# Patient Record
Sex: FEMALE | Race: WHITE | Employment: UNEMPLOYED | URBAN - METROPOLITAN AREA
[De-identification: names, ages, dates, MRNs, and addresses within clinical notes are randomized per-mention and may not be internally consistent; named-entity substitution may affect disease eponyms.]

---

## 2022-06-20 ENCOUNTER — OFFICE VISIT (OUTPATIENT)
Dept: PRIMARY CARE CLINIC | Age: 52
End: 2022-06-20
Payer: MEDICAID

## 2022-06-20 VITALS
BODY MASS INDEX: 38.43 KG/M2 | OXYGEN SATURATION: 92 % | HEIGHT: 63 IN | WEIGHT: 216.9 LBS | TEMPERATURE: 97.2 F | HEART RATE: 77 BPM | DIASTOLIC BLOOD PRESSURE: 84 MMHG | SYSTOLIC BLOOD PRESSURE: 146 MMHG

## 2022-06-20 DIAGNOSIS — I10 PRIMARY HYPERTENSION: ICD-10-CM

## 2022-06-20 DIAGNOSIS — Z01.419 WELL FEMALE EXAM WITH ROUTINE GYNECOLOGICAL EXAM: ICD-10-CM

## 2022-06-20 DIAGNOSIS — Z12.31 BREAST CANCER SCREENING BY MAMMOGRAM: ICD-10-CM

## 2022-06-20 DIAGNOSIS — Z13.220 LIPID SCREENING: ICD-10-CM

## 2022-06-20 DIAGNOSIS — Z11.59 NEED FOR HEPATITIS C SCREENING TEST: ICD-10-CM

## 2022-06-20 DIAGNOSIS — Z12.11 COLON CANCER SCREENING: ICD-10-CM

## 2022-06-20 DIAGNOSIS — M05.79 RHEUMATOID ARTHRITIS INVOLVING MULTIPLE SITES WITH POSITIVE RHEUMATOID FACTOR (HCC): ICD-10-CM

## 2022-06-20 DIAGNOSIS — Z00.8 ENCOUNTER FOR ELECTROCARDIOGRAM: ICD-10-CM

## 2022-06-20 DIAGNOSIS — M05.79 RHEUMATOID ARTHRITIS INVOLVING MULTIPLE SITES WITH POSITIVE RHEUMATOID FACTOR (HCC): Primary | ICD-10-CM

## 2022-06-20 DIAGNOSIS — J41.0 SIMPLE CHRONIC BRONCHITIS (HCC): ICD-10-CM

## 2022-06-20 DIAGNOSIS — R53.82 CHRONIC FATIGUE: ICD-10-CM

## 2022-06-20 LAB
ALBUMIN SERPL-MCNC: 4.3 G/DL (ref 3.5–5.2)
ALP BLD-CCNC: 90 U/L (ref 35–104)
ALT SERPL-CCNC: 15 U/L (ref 0–32)
ANION GAP SERPL CALCULATED.3IONS-SCNC: 10 MMOL/L (ref 7–16)
AST SERPL-CCNC: 18 U/L (ref 0–31)
BASOPHILS ABSOLUTE: 0.04 E9/L (ref 0–0.2)
BASOPHILS RELATIVE PERCENT: 0.4 % (ref 0–2)
BILIRUB SERPL-MCNC: 0.5 MG/DL (ref 0–1.2)
BILIRUBIN URINE: NEGATIVE
BLOOD, URINE: NEGATIVE
BUN BLDV-MCNC: 7 MG/DL (ref 6–20)
C-REACTIVE PROTEIN: 1.4 MG/DL (ref 0–0.4)
CALCIUM SERPL-MCNC: 9.6 MG/DL (ref 8.6–10.2)
CHLORIDE BLD-SCNC: 101 MMOL/L (ref 98–107)
CHOLESTEROL, TOTAL: 218 MG/DL (ref 0–199)
CLARITY: CLEAR
CO2: 29 MMOL/L (ref 22–29)
COLOR: YELLOW
CREAT SERPL-MCNC: 0.6 MG/DL (ref 0.5–1)
CREATININE URINE: 100 MG/DL (ref 29–226)
EOSINOPHILS ABSOLUTE: 0 E9/L (ref 0.05–0.5)
EOSINOPHILS RELATIVE PERCENT: 0 % (ref 0–6)
GFR AFRICAN AMERICAN: >60
GFR NON-AFRICAN AMERICAN: >60 ML/MIN/1.73
GLUCOSE BLD-MCNC: 90 MG/DL (ref 74–99)
GLUCOSE URINE: NEGATIVE MG/DL
HCT VFR BLD CALC: 62.2 % (ref 34–48)
HDLC SERPL-MCNC: 47 MG/DL
HEMOGLOBIN: 18.4 G/DL (ref 11.5–15.5)
IMMATURE GRANULOCYTES #: 0.03 E9/L
IMMATURE GRANULOCYTES %: 0.3 % (ref 0–5)
KETONES, URINE: NEGATIVE MG/DL
LDL CHOLESTEROL CALCULATED: 144 MG/DL (ref 0–99)
LEUKOCYTE ESTERASE, URINE: NEGATIVE
LYMPHOCYTES ABSOLUTE: 2.3 E9/L (ref 1.5–4)
LYMPHOCYTES RELATIVE PERCENT: 24.6 % (ref 20–42)
MCH RBC QN AUTO: 27.2 PG (ref 26–35)
MCHC RBC AUTO-ENTMCNC: 29.6 % (ref 32–34.5)
MCV RBC AUTO: 92 FL (ref 80–99.9)
MICROALBUMIN UR-MCNC: <12 MG/L
MICROALBUMIN/CREAT UR-RTO: ABNORMAL (ref 0–30)
MONOCYTES ABSOLUTE: 0.72 E9/L (ref 0.1–0.95)
MONOCYTES RELATIVE PERCENT: 7.7 % (ref 2–12)
NEUTROPHILS ABSOLUTE: 6.25 E9/L (ref 1.8–7.3)
NEUTROPHILS RELATIVE PERCENT: 67 % (ref 43–80)
NITRITE, URINE: NEGATIVE
PDW BLD-RTO: 16.5 FL (ref 11.5–15)
PH UA: 6 (ref 5–9)
PLATELET # BLD: 241 E9/L (ref 130–450)
PMV BLD AUTO: 12.6 FL (ref 7–12)
POTASSIUM SERPL-SCNC: 4.5 MMOL/L (ref 3.5–5)
PROTEIN UA: NEGATIVE MG/DL
RBC # BLD: 6.76 E12/L (ref 3.5–5.5)
RHEUMATOID FACTOR: 476 IU/ML (ref 0–13)
SODIUM BLD-SCNC: 140 MMOL/L (ref 132–146)
SPECIFIC GRAVITY UA: 1.02 (ref 1–1.03)
TOTAL PROTEIN: 8 G/DL (ref 6.4–8.3)
TRIGL SERPL-MCNC: 137 MG/DL (ref 0–149)
TSH SERPL DL<=0.05 MIU/L-ACNC: 2.39 UIU/ML (ref 0.27–4.2)
UROBILINOGEN, URINE: 0.2 E.U./DL
VITAMIN D 25-HYDROXY: 16 NG/ML (ref 30–100)
VLDLC SERPL CALC-MCNC: 27 MG/DL
WBC # BLD: 9.3 E9/L (ref 4.5–11.5)

## 2022-06-20 PROCEDURE — 4004F PT TOBACCO SCREEN RCVD TLK: CPT | Performed by: INTERNAL MEDICINE

## 2022-06-20 PROCEDURE — 93000 ELECTROCARDIOGRAM COMPLETE: CPT | Performed by: INTERNAL MEDICINE

## 2022-06-20 PROCEDURE — G8427 DOCREV CUR MEDS BY ELIG CLIN: HCPCS | Performed by: INTERNAL MEDICINE

## 2022-06-20 PROCEDURE — 3017F COLORECTAL CA SCREEN DOC REV: CPT | Performed by: INTERNAL MEDICINE

## 2022-06-20 PROCEDURE — 3023F SPIROM DOC REV: CPT | Performed by: INTERNAL MEDICINE

## 2022-06-20 PROCEDURE — G8417 CALC BMI ABV UP PARAM F/U: HCPCS | Performed by: INTERNAL MEDICINE

## 2022-06-20 PROCEDURE — 99205 OFFICE O/P NEW HI 60 MIN: CPT | Performed by: INTERNAL MEDICINE

## 2022-06-20 SDOH — ECONOMIC STABILITY: FOOD INSECURITY: WITHIN THE PAST 12 MONTHS, THE FOOD YOU BOUGHT JUST DIDN'T LAST AND YOU DIDN'T HAVE MONEY TO GET MORE.: NEVER TRUE

## 2022-06-20 SDOH — ECONOMIC STABILITY: FOOD INSECURITY: WITHIN THE PAST 12 MONTHS, YOU WORRIED THAT YOUR FOOD WOULD RUN OUT BEFORE YOU GOT MONEY TO BUY MORE.: NEVER TRUE

## 2022-06-20 ASSESSMENT — PATIENT HEALTH QUESTIONNAIRE - PHQ9
1. LITTLE INTEREST OR PLEASURE IN DOING THINGS: 0
2. FEELING DOWN, DEPRESSED OR HOPELESS: 0
SUM OF ALL RESPONSES TO PHQ QUESTIONS 1-9: 0
SUM OF ALL RESPONSES TO PHQ QUESTIONS 1-9: 0
SUM OF ALL RESPONSES TO PHQ9 QUESTIONS 1 & 2: 0
SUM OF ALL RESPONSES TO PHQ QUESTIONS 1-9: 0
SUM OF ALL RESPONSES TO PHQ QUESTIONS 1-9: 0

## 2022-06-20 ASSESSMENT — ANXIETY QUESTIONNAIRES
6. BECOMING EASILY ANNOYED OR IRRITABLE: 0
4. TROUBLE RELAXING: 0
3. WORRYING TOO MUCH ABOUT DIFFERENT THINGS: 0
IF YOU CHECKED OFF ANY PROBLEMS ON THIS QUESTIONNAIRE, HOW DIFFICULT HAVE THESE PROBLEMS MADE IT FOR YOU TO DO YOUR WORK, TAKE CARE OF THINGS AT HOME, OR GET ALONG WITH OTHER PEOPLE: NOT DIFFICULT AT ALL
5. BEING SO RESTLESS THAT IT IS HARD TO SIT STILL: 0
1. FEELING NERVOUS, ANXIOUS, OR ON EDGE: 0
2. NOT BEING ABLE TO STOP OR CONTROL WORRYING: 0
GAD7 TOTAL SCORE: 0
7. FEELING AFRAID AS IF SOMETHING AWFUL MIGHT HAPPEN: 0

## 2022-06-20 ASSESSMENT — SOCIAL DETERMINANTS OF HEALTH (SDOH): HOW HARD IS IT FOR YOU TO PAY FOR THE VERY BASICS LIKE FOOD, HOUSING, MEDICAL CARE, AND HEATING?: NOT HARD AT ALL

## 2022-06-20 NOTE — PROGRESS NOTES
HPI:  The patient is a 45 y.o. female who presents today to [facility] care. She has a past medical history of rheumatoid arthritis that has been in remission for almost 10 years. This was confirmed by rheumatologist.  She was on some medication in the beginning of the diagnosis but she was able to get off of them. She also has a history of exposure to TB and was treated for 6-month. She has been smoking for over 20 years started off at 2 packs a day and now she is trying to wean off with about 10 cigarettes/day. The patient complains of shortness of breath on mild exertion may be climbing 1 flight of stairs or walking for 1 block. On exam she has auditory wheezing on bilateral chest feels associated with harsh adventitious sounds and rhonchi. She denies any chest pains abdominal pains nausea vomiting or diarrhea. She is not currently on any medication except as needed ibuprofen for her aches and pains. She fell few days ago on the floor with mild complaint of tenderness on the right supraclavicular area. She was also found to be hypertensive today questionable whitecoat syndrome. .    Past Medical History:   Diagnosis Date    Arthritis, rheumatic, acute or subacute     Rheumatoid arthritis involving multiple sites with positive rheumatoid factor ([facility] [address] 6/20/2022      No past surgical history on file. No family history on file.   Social History     Socioeconomic History    Marital status:      Spouse name: Not on file    Number of children: Not on file    Years of education: Not on file    Highest education level: Not on file   Occupational History    Not on file   Tobacco Use    Smoking status: Current Every Day Smoker     Packs/day: 0.50     Years: 38.00     Pack years: 19.00     Types: Cigarettes     Start date: 45    Smokeless tobacco: Never Used   Vaping Use    Vaping Use: Some days    Substances: Nicotine, Flavoring    Devices: Disposable, Pre-filled or refillable cartridge    Passive vaping exposure: Yes   Substance and Sexual Activity    Alcohol use: No    Drug use: No    Sexual activity: Not on file   Other Topics Concern    Not on file   Social History Narrative    Not on file     Social Determinants of Health     Financial Resource Strain: Low Risk     Difficulty of Paying Living Expenses: Not hard at all   Food Insecurity: No Food Insecurity    Worried About [address] in the Last Year: Never true    [address] in the Last Year: Never true   Transportation Needs:     Lack of Transportation (Medical): Not on file    Lack of Transportation (Non-Medical):  Not on file   Physical Activity:     Days of Exercise per Week: Not on file    Minutes of Exercise per Session: Not on file   Stress:     Feeling of Stress : Not on file   Social Connections:     Frequency of Communication with Friends and Family: Not on file    Frequency of Social Gatherings with Friends and Family: Not on file    Attends [employer] Services: Not on file    Active Member of [address] [place] [address] or Organizations: Not on file    Attends Club or Organization Meetings: Not on file    Marital Status: Not on file   Intimate Partner Violence:     Fear of Current or Ex-Partner: Not on file    Emotionally Abused: Not on file    Physically Abused: Not on file    Sexually Abused: Not on file   Housing Stability:     Unable to Pay for Housing in the Last Year: Not on file    Number of [place] in the Last Year: Not on file    Unstable Housing in the Last Year: Not on file      No Known Allergies     Review of Systems:  Constitutional:  No fever, no fatigue, no chills, no headaches, no weight change  Dermatology:  No rash, no mole, no dry or sensitive skin  ENT:  No cough, no sore throat, no sinus pain, no runny nose, no ear pain  Cardiology:  No chest pain, no palpitations, no leg edema, no shortness of breath, no PND  Endocrinology:  No polydipsia, no polyuria, no cold intolerance, no heat intolerance, no polyphagia, no hair changes  Gastroenterology:  No dysphagia, no abdominal pain, no nausea, no vomiting, no constipation, no diarrhea, no heartburn  Female Reproductive:  No hot flashes, no abnormal vaginal discharge, no pain with menstruation, no pelvic pain  Musculoskeletal:  No joint pain, no leg cramps, no back pain, no muscle aches  Respiratory:  No shortness of breath, no orthopnea, no wheezing, no HERNANDEZ, no hemoptysis  Urology:  No blood in the urine, no urinary frequency, no urinary incontinence, no urinary urgency, no nocturia, no dysuria  Neurology:  No numbness/tingling, no dizziness, no weakness  Psychology:  No depression, no sleep disturbances, no suicidal ideation, no anxiety      PHYSICAL EXAM:    Vitals:  BP (!) 146/84   Pulse 77   Temp 97.2 °F (36.2 °C) (Temporal)   Ht 5' 2.5\" (1.588 m)   Wt 216 lb 14.4 oz (98.4 kg)   LMP 03/15/2022 (Exact Date) Comment: Tubal Ligation  SpO2 92%   Breastfeeding No   BMI 39.04 kg/m²     General:  Awake, alert, oriented X 3. Well developed, well nourished, well groomed. Obese, no apparent distress. HEENT:  Normocephalic, atraumatic. Pupils equal, round, reactive to light. No scleral icterus. No conjunctival injection. Normal lips, lost most of her teeth, no  Gum issues. No nasal discharge. Neck:  Supple  Heart:  RRR, no murmurs, gallops, or rubs  Lungs: Scattered harsh rhonchi all over bilateral lung fields with expiratory wheezing and adventitious sounds on deep breathing with no rales  Abdomen: Bowel sounds present, soft, nontender, no masses, no organomegaly, no peritoneal signs  Extremities:  No clubbing, cyanosis, or edema  Skin:  Warm and dry, no open lesions or rash  Neuro:  Cranial nerves 2-12 intact, no focal deficits    Assessment & Plan:  1. Rheumatoid arthritis involving multiple sites with positive rheumatoid factor (HCC)  -     TOMA; Future  -     RHEUMATOID FACTOR; Future  -     Sedimentation Rate;  Future  - C-Reactive Protein; Future  -     Vitamin D 25 Hydroxy; Future  2. Simple chronic bronchitis (HCC)  -     XR CHEST (2 VW); Future  3. Primary hypertension  -     Comprehensive Metabolic Panel; Future  -     CBC with Auto Differential; Future  -     Urinalysis; Future  -     Microalbumin / Creatinine Urine Ratio; Future  4. Chronic fatigue  -     TSH; Future  -     Hepatitis C Antibody; Future  5. Need for hepatitis C screening test  6. Breast cancer screening by mammogram  -     MARIBELL DIGITAL SCREEN W OR WO CAD BILATERAL; Future  7. Colon cancer screening  8. Lipid screening  -     Lipid Panel; Future     Was counseled to get her routine screening mammogram yearly and have pelvic exam.  We will refer to gynecology.   She was also counseled to get her colonoscopy

## 2022-06-21 LAB
HEPATITIS C ANTIBODY INTERPRETATION: REACTIVE
SEDIMENTATION RATE, ERYTHROCYTE: 0 MM/HR (ref 0–20)

## 2022-06-22 LAB — ANTI-NUCLEAR ANTIBODY (ANA): NEGATIVE

## 2022-06-24 ENCOUNTER — TELEPHONE (OUTPATIENT)
Dept: PRIMARY CARE CLINIC | Age: 52
End: 2022-06-24

## 2022-06-27 ENCOUNTER — TELEPHONE (OUTPATIENT)
Dept: PRIMARY CARE CLINIC | Age: 52
End: 2022-06-27

## 2022-06-27 NOTE — TELEPHONE ENCOUNTER
Pt received sample of breztri at last appt and is using 2times in am 2times in pm will run out prior to appt- do you want refilled- if so please send to [facility]

## 2022-06-30 ENCOUNTER — TELEPHONE (OUTPATIENT)
Dept: PRIMARY CARE CLINIC | Age: 52
End: 2022-06-30

## 2022-06-30 NOTE — TELEPHONE ENCOUNTER
PT WILL CHECK WITH HER INSURANCE COMPANY AND SEE WHAT IS COVERED IN THAT CLASS OF DRUGS, SHE WILL THEN CALL US BACK.

## 2022-07-01 ENCOUNTER — TELEPHONE (OUTPATIENT)
Dept: PRIMARY CARE CLINIC | Age: 52
End: 2022-07-01

## 2022-07-05 ENCOUNTER — OFFICE VISIT (OUTPATIENT)
Dept: PRIMARY CARE CLINIC | Age: 52
End: 2022-07-05
Payer: MEDICAID

## 2022-07-05 ENCOUNTER — HOSPITAL ENCOUNTER (OUTPATIENT)
Dept: GENERAL RADIOLOGY | Age: 52
Discharge: HOME | End: 2022-07-07
Payer: MEDICAID

## 2022-07-05 ENCOUNTER — HOSPITAL ENCOUNTER (OUTPATIENT)
Age: 52
Discharge: HOME | End: 2022-07-07
Payer: MEDICAID

## 2022-07-05 VITALS
BODY MASS INDEX: 39.05 KG/M2 | HEIGHT: 63 IN | SYSTOLIC BLOOD PRESSURE: 130 MMHG | WEIGHT: 220.4 LBS | DIASTOLIC BLOOD PRESSURE: 78 MMHG | OXYGEN SATURATION: 99 % | TEMPERATURE: 96.8 F | HEART RATE: 69 BPM

## 2022-07-05 DIAGNOSIS — R76.8 HEPATITIS C ANTIBODY POSITIVE IN BLOOD: Primary | ICD-10-CM

## 2022-07-05 DIAGNOSIS — E78.2 HYPERLIPIDEMIA, MIXED: ICD-10-CM

## 2022-07-05 DIAGNOSIS — E55.9 VITAMIN D DEFICIENCY: ICD-10-CM

## 2022-07-05 DIAGNOSIS — M05.79 RHEUMATOID ARTHRITIS INVOLVING MULTIPLE SITES WITH POSITIVE RHEUMATOID FACTOR (HCC): ICD-10-CM

## 2022-07-05 DIAGNOSIS — J41.0 SIMPLE CHRONIC BRONCHITIS (HCC): ICD-10-CM

## 2022-07-05 DIAGNOSIS — R76.8 HEPATITIS C ANTIBODY POSITIVE IN BLOOD: ICD-10-CM

## 2022-07-05 DIAGNOSIS — J41.8 MIXED SIMPLE AND MUCOPURULENT CHRONIC BRONCHITIS (HCC): ICD-10-CM

## 2022-07-05 DIAGNOSIS — R53.82 CHRONIC FATIGUE: ICD-10-CM

## 2022-07-05 PROCEDURE — 3023F SPIROM DOC REV: CPT | Performed by: INTERNAL MEDICINE

## 2022-07-05 PROCEDURE — 71046 X-RAY EXAM CHEST 2 VIEWS: CPT

## 2022-07-05 PROCEDURE — 4004F PT TOBACCO SCREEN RCVD TLK: CPT | Performed by: INTERNAL MEDICINE

## 2022-07-05 PROCEDURE — G8427 DOCREV CUR MEDS BY ELIG CLIN: HCPCS | Performed by: INTERNAL MEDICINE

## 2022-07-05 PROCEDURE — G8417 CALC BMI ABV UP PARAM F/U: HCPCS | Performed by: INTERNAL MEDICINE

## 2022-07-05 PROCEDURE — 3017F COLORECTAL CA SCREEN DOC REV: CPT | Performed by: INTERNAL MEDICINE

## 2022-07-05 PROCEDURE — 99214 OFFICE O/P EST MOD 30 MIN: CPT | Performed by: INTERNAL MEDICINE

## 2022-07-05 RX ORDER — BUDESONIDE AND FORMOTEROL FUMARATE DIHYDRATE 160; 4.5 UG/1; UG/1
2 AEROSOL RESPIRATORY (INHALATION) 2 TIMES DAILY
Qty: 30.6 G | Refills: 1 | Status: SHIPPED | OUTPATIENT
Start: 2022-07-05

## 2022-07-05 NOTE — PROGRESS NOTES
Chief Complaint   Patient presents with    2 Week Follow-Up     Pt is here as a 2 week F/U, and states she is feeling better at this time. Pt had a period last week, and profile updated. HPI:  Patient is here for follow-up of blood work COPD and rheumatoid arthritis. Patient is doing a lot better with using Brezetri  inhaler. Her shortness of breath has completely subsided and she noticed it more when she ran out for the last 2 days. Unfortunately she has to have a prior authorization for any of the inhalers. We will send her Trelegy. Blood work was discussed with patient appears within reasonable range except for hyperlipidemia with an LDL of 144. Patient was counseled for low-fat diet. Also her hemoglobin count was elevated at 18 g questionable secondary to hypercapnia and smoking. Patient was advised to do some breathing exercising decrease the amount of smoking and we will check her for sleep apnea as her BMI is more than 39 and according to her daughter she snores at night and have a very unrestful sleep. Rheumatoid factor was elevated but her ESR was within normal limits. CRP is mildly elevated. Hepatitis C came back reactive and patient denies any blood transfusion or any infection from prior or any sexually transmitted disease as she has been matted in with her  for close to 30 years. We will check acute hepatitis panel,  And viral load and ultrasound of the liver    Past Medical History, Surgical History, and Family History has been reviewed and updated.     Review of Systems:  Constitutional:  No fever, no fatigue, no chills, no headaches, no weight change  Dermatology:  No rash, no mole, no dry or sensitive skin  ENT:  No cough, no sore throat, no sinus pain, no runny nose, no ear pain  Cardiology:  No chest pain, no palpitations, no leg edema, no shortness of breath, no PND  Gastroenterology:  No dysphagia, no abdominal pain, no nausea, no vomiting, no constipation, no diarrhea, no heartburn  Musculoskeletal:  No joint pain, no leg cramps, no back pain, no muscle aches  Respiratory:  No shortness of breath, no orthopnea, no wheezing, no HERNANDEZ, no hemoptysis  Urology:  No blood in the urine, no urinary frequency, no urinary incontinence, no urinary urgency, no nocturia, no dysuria    Vitals:    07/05/22 0941   BP: 130/78   Pulse: 69   Temp: 96.8 °F (36 °C)   TempSrc: Temporal   SpO2: 99%   Weight: 220 lb 6.4 oz (100 kg)   Height: 5' 2.5\" (1.588 m)       General:  Patient alert and oriented x 3, NAD, pleasant  HEENT:  Atraumatic, normocephalic, PERRLA, EOMI, clear conjunctiva, TMs clear, nose-clear, throat - no erythema  Neck:  Supple, no goiter, no carotid bruits, no LAD  Lungs: Tattered harsh rhonchi mainly on the right base with improved air exchange  Heart:  RRR, no murmurs, gallops or rubs  Abdomen:  Soft/nt/nd, + bowel sounds  Extremities:  No clubbing, cyanosis or edema  Skin: unremarkable    Cholesterol, Total   Date Value Ref Range Status   06/20/2022 218 (H) 0 - 199 mg/dL Final     Triglycerides   Date Value Ref Range Status   06/20/2022 137 0 - 149 mg/dL Final     HDL   Date Value Ref Range Status   06/20/2022 47 >40 mg/dL Final     LDL Calculated   Date Value Ref Range Status   06/20/2022 144 (H) 0 - 99 mg/dL Final     VLDL Cholesterol Calculated   Date Value Ref Range Status   06/20/2022 27 mg/dL Final     Sodium   Date Value Ref Range Status   06/20/2022 140 132 - 146 mmol/L Final     Potassium   Date Value Ref Range Status   06/20/2022 4.5 3.5 - 5.0 mmol/L Final     Chloride   Date Value Ref Range Status   06/20/2022 101 98 - 107 mmol/L Final     CO2   Date Value Ref Range Status   06/20/2022 29 22 - 29 mmol/L Final     BUN   Date Value Ref Range Status   06/20/2022 7 6 - 20 mg/dL Final     CREATININE   Date Value Ref Range Status   06/20/2022 0.6 0.5 - 1.0 mg/dL Final     Glucose   Date Value Ref Range Status   06/20/2022 90 74 - 99 mg/dL Final     Calcium   Date Value Ref Range Status   06/20/2022 9.6 8.6 - 10.2 mg/dL Final     Total Protein   Date Value Ref Range Status   06/20/2022 8.0 6.4 - 8.3 g/dL Final     Albumin   Date Value Ref Range Status   06/20/2022 4.3 3.5 - 5.2 g/dL Final     Total Bilirubin   Date Value Ref Range Status   06/20/2022 0.5 0.0 - 1.2 mg/dL Final     Alkaline Phosphatase   Date Value Ref Range Status   06/20/2022 90 35 - 104 U/L Final     AST   Date Value Ref Range Status   06/20/2022 18 0 - 31 U/L Final     ALT   Date Value Ref Range Status   06/20/2022 15 0 - 32 U/L Final     GFR Non-   Date Value Ref Range Status   06/20/2022 >60 >=60 mL/min/1.73 Final     Comment:     Chronic Kidney Disease: less than 60 ml/min/1.73 sq.m. Kidney Failure: less than 15 ml/min/1.73 sq.m. Results valid for patients 18 years and older. GFR    Date Value Ref Range Status   06/20/2022 >60  Final        No results found. Assessment/Plan:      Outpatient Encounter Medications as of 7/5/2022   Medication Sig Dispense Refill    fluticasone-umeclidin-vilant (TRELEGY ELLIPTA) 100-62.5-25 MCG/INH AEPB Inhale 1 puff into the lungs daily 1 each 3    Budeson-Glycopyrrol-Formoterol 160-9-4.8 MCG/ACT AERO Inhale 2 inhalations into the lungs in the morning and at bedtime 1 each 2     No facility-administered encounter medications on file as of 7/5/2022. Janie Vila was seen today for 2 week follow-up. Diagnoses and all orders for this visit:    Hepatitis C antibody positive in blood  -     HEP C VIRAL LOAD; Future  -     Hepatitis Panel, Acute; Future    Simple chronic bronchitis (HCC)    Rheumatoid arthritis involving multiple sites with positive rheumatoid factor (HCC)    Chronic fatigue    Hyperlipidemia, mixed    Vitamin D deficiency    Other orders  -     US ABDOMEN COMPLETE; Future  -     fluticasone-umeclidin-vilant (Sisto Sprinkles) 100-62.5-25 MCG/INH AEPB;  Inhale 1 puff into the lungs daily    Low-fat diet counseling  Check ultrasound of the abdomen  Check hepatitis C viral load and acute hepatitis panel  Start Trelegy 1 puff daily  Will check for sleep apnea study in the next visit    Patient Instructions   Vit D 5000 IU daily       On this date 7/5/2022 I have spent 30 minutes reviewing previous notes, test results and face to face with the patient discussing the diagnosis and importance of compliance with the treatment plan as well as documenting on the day of the visit.       Andrea Walton MD   7/5/22

## 2022-07-08 LAB
HAV IGM SER IA-ACNC: ABNORMAL
HEPATITIS B CORE IGM ANTIBODY: ABNORMAL
HEPATITIS B SURFACE ANTIGEN INTERPRETATION: ABNORMAL
HEPATITIS C ANTIBODY INTERPRETATION: REACTIVE

## 2022-07-09 LAB
HCV QNT BY NAAT IU/ML: NOT DETECTED IU/ML
HCV QNT BY NAAT LOG IU/ML: NOT DETECTED LOG IU/ML
INTERPRETATION: NOT DETECTED

## 2022-07-12 ENCOUNTER — HOSPITAL ENCOUNTER (OUTPATIENT)
Dept: ULTRASOUND IMAGING | Age: 52
Discharge: HOME | End: 2022-07-12
Payer: MEDICAID

## 2022-07-12 DIAGNOSIS — R76.8 HEPATITIS C ANTIBODY POSITIVE IN BLOOD: ICD-10-CM

## 2022-07-12 PROCEDURE — 76705 ECHO EXAM OF ABDOMEN: CPT

## 2022-07-15 ENCOUNTER — HOSPITAL ENCOUNTER (OUTPATIENT)
Dept: MAMMOGRAPHY | Age: 52
Discharge: HOME | End: 2022-07-17
Payer: MEDICAID

## 2022-07-15 VITALS — WEIGHT: 210 LBS | HEIGHT: 62 IN | BODY MASS INDEX: 38.64 KG/M2

## 2022-07-15 DIAGNOSIS — Z12.31 BREAST CANCER SCREENING BY MAMMOGRAM: ICD-10-CM

## 2022-07-15 PROCEDURE — 77063 BREAST TOMOSYNTHESIS BI: CPT

## 2022-07-20 PROBLEM — Z11.59 NEED FOR HEPATITIS C SCREENING TEST: Status: RESOLVED | Noted: 2022-06-20 | Resolved: 2022-07-20

## 2022-08-08 ENCOUNTER — OFFICE VISIT (OUTPATIENT)
Dept: PRIMARY CARE CLINIC | Age: 52
End: 2022-08-08
Payer: MEDICAID

## 2022-08-08 VITALS
OXYGEN SATURATION: 94 % | WEIGHT: 217 LBS | HEIGHT: 62 IN | HEART RATE: 72 BPM | DIASTOLIC BLOOD PRESSURE: 84 MMHG | TEMPERATURE: 97.4 F | BODY MASS INDEX: 39.93 KG/M2 | SYSTOLIC BLOOD PRESSURE: 138 MMHG

## 2022-08-08 DIAGNOSIS — M05.79 RHEUMATOID ARTHRITIS INVOLVING MULTIPLE SITES WITH POSITIVE RHEUMATOID FACTOR (HCC): ICD-10-CM

## 2022-08-08 DIAGNOSIS — R53.83 FATIGUE, UNSPECIFIED TYPE: ICD-10-CM

## 2022-08-08 DIAGNOSIS — E78.2 HYPERLIPIDEMIA, MIXED: ICD-10-CM

## 2022-08-08 DIAGNOSIS — R76.8 HEPATITIS C ANTIBODY TEST POSITIVE: Primary | ICD-10-CM

## 2022-08-08 DIAGNOSIS — I10 PRIMARY HYPERTENSION: ICD-10-CM

## 2022-08-08 PROCEDURE — G8427 DOCREV CUR MEDS BY ELIG CLIN: HCPCS | Performed by: INTERNAL MEDICINE

## 2022-08-08 PROCEDURE — 3017F COLORECTAL CA SCREEN DOC REV: CPT | Performed by: INTERNAL MEDICINE

## 2022-08-08 PROCEDURE — G8417 CALC BMI ABV UP PARAM F/U: HCPCS | Performed by: INTERNAL MEDICINE

## 2022-08-08 PROCEDURE — 99214 OFFICE O/P EST MOD 30 MIN: CPT | Performed by: INTERNAL MEDICINE

## 2022-08-08 PROCEDURE — 4004F PT TOBACCO SCREEN RCVD TLK: CPT | Performed by: INTERNAL MEDICINE

## 2022-08-08 NOTE — PROGRESS NOTES
Chief Complaint   Patient presents with    1 Month Follow-Up     Pt is here to discuss labs that were drawn, U/S Abdomen, and Mammogram.        HPI:  Patient is here for follow-up of COPD and hepatitis C positive antibody. Patient is feeling great especially after using the inhaler. She has postponed her appointment with gastroenterology but she was referred again. Her hepatitis C viral load is not detectable so patient was reassured. We will check with GI. Ultrasound of the abdomen showed a fatty liver with cholelithiasis and sludge without any cholecystitis. Patient is completely asymptomatic. She was reassured  . Patient complains of snoring and tiredness and fatigue through the day and interrupted sleep through the night. She is unfortunately still smoking but trying to decrease the amount of cigarettes. We will check her sleep study. .  Blood pressure today is mildly elevated and patient had few cups of coffee this morning she was advised to decrease her caffeine and salt intake and we will recheck back in 3-month. Past Medical History, Surgical History, and Family History has been reviewed and updated.     Review of Systems:  Constitutional:  No fever, no fatigue, no chills, no headaches, no weight change  Dermatology:  No rash, no mole, no dry or sensitive skin  ENT:  No cough, no sore throat, no sinus pain, no runny nose, no ear pain  Cardiology:  No chest pain, no palpitations, no leg edema, no shortness of breath, no PND  Gastroenterology:  No dysphagia, no abdominal pain, no nausea, no vomiting, no constipation, no diarrhea, no heartburn  Musculoskeletal:  No joint pain, no leg cramps, no back pain, no muscle aches  Respiratory:  No shortness of breath, no orthopnea, no wheezing, no HERNANDEZ, no hemoptysis  Urology:  No blood in the urine, no urinary frequency, no urinary incontinence, no urinary urgency, no nocturia, no dysuria    Vitals:    08/08/22 1132 08/08/22 1155   BP: (!) 142/80 138/84 Ref Range Status   06/20/2022 18 0 - 31 U/L Final     ALT   Date Value Ref Range Status   06/20/2022 15 0 - 32 U/L Final     GFR Non-   Date Value Ref Range Status   06/20/2022 >60 >=60 mL/min/1.73 Final     Comment:     Chronic Kidney Disease: less than 60 ml/min/1.73 sq.m. Kidney Failure: less than 15 ml/min/1.73 sq.m. Results valid for patients 18 years and older. GFR    Date Value Ref Range Status   06/20/2022 >60  Final        US ABDOMEN LIMITED    Result Date: 7/12/2022  EXAMINATION: RIGHT UPPER QUADRANT ULTRASOUND 7/12/2022 9:43 am COMPARISON: None. HISTORY: ORDERING SYSTEM PROVIDED HISTORY: Hepatitis C antibody positive in blood TECHNOLOGIST PROVIDED HISTORY: What reading provider will be dictating this exam?->CRC FINDINGS: LIVER:  The liver demonstrates diffuse fatty infiltration without evidence of intrahepatic biliary ductal dilatation. BILIARY SYSTEM:  Cardiac thigh assess and sludge. No pericholecystic fluid. No gallbladder wall thickening. .  Negative sonographic Marie's sign. Common bile duct is within normal limits measuring 4 mm. Lisa Gip RIGHT KIDNEY: The right kidney is grossly unremarkable without evidence of hydronephrosis. PANCREAS:  Visualized portions of the pancreas are unremarkable. OTHER: No evidence of right upper quadrant ascites. Cholelithiasis and sludge in an otherwise normal-appearing gallbladder. Normal common bile duct. Fatty infiltration of the liver. [facility] [place] DIGITAL SCREEN BILATERAL    Result Date: 7/16/2022  EXAMINATION: SCREENING DIGITAL BILATERAL MAMMOGRAM WITH TOMOSYNTHESIS, 7/15/2022 TECHNIQUE: Screening mammography of the bilateral breasts was performed with tomosynthesis. 2D standard and 3D tomosynthesis combination imaging performed through both breasts in the MLO and CC projection. Computer aided detection was utilized in the interpretation of this exam. COMPARISON: None HISTORY: Screening.  No personal or family history of breast cancer. TC score of 5%. FINDINGS: There are scattered fibroglandular densities. No suspicious mass, microcalcifications, or architectural distortion identified in either breast.     No mammographic evidence of malignancy in either breast. RECOMMENDATION: Bilateral mammogram is advised in 1 year. BIRADS: BIRADS - CATEGORY 1 Negative. OVERALL ASSESSMENT - NEGATIVE A letter of notification will be sent to the patient regarding the results. RISK ASSESSMENT: During this patient's visit, information obtained was used to generate a lifetime risk assessment using the Tyrer-Cuzick model (also called the JEEVAN International Breast Cancer Intervention study Breast Cancer Risk Evaluation Tool). LIFETIME RISK: Patient has a Tyrer-Cuzick score of: 5% BREAST TISSUE DENSITY Scattered (grade B) AVERAGE RISK ( < 15% Lifetime Risk) Yearly screening mammograms starting at age 36 and older. Regardless of breast density, tomosynthesis is suggested. Monthly self-breast exams are recommended for women age 27 and older. Note: Mammography is not 100% accurate in the detection of breast cancer. Accuracy decreases as mammographic density of the breast increases. A negative mammogram should not deter further evaluation (including biopsy) of suspicious physical findings. Recommendations are based on NCCN ([address]) and ACR [employer] of Radiology) guidelines. Thank you for sending your patient to this ACR and FDA approved facility. If there are physician concerns regarding this report, a Radiologist can be reached by calling the following number [phone].        Assessment/Plan:      Outpatient Encounter Medications as of 8/8/2022   Medication Sig Dispense Refill    budesonide-formoterol (SYMBICORT) 160-4.5 MCG/ACT AERO Inhale 2 puffs into the lungs 2 times daily 30.6 g 1    [DISCONTINUED] Budeson-Glycopyrrol-Formoterol 160-9-4.8 MCG/ACT AERO Inhale 2 inhalations into the lungs in the morning and at bedtime (Patient not taking: Reported on 8/8/2022) 1 each 2     No facility-administered encounter medications on file as of 8/8/2022. Mariam Johnson was seen today for 1 month follow-up. Diagnoses and all orders for this visit:    Hepatitis C antibody test positive  -     Rebel Andersen MD, Gastroenterology, [facility]    Fatigue, unspecified type  -     Sleep Study with PAP Titration; Future    Primary hypertension  -     CBC with Auto Differential; Future  -     Basic Metabolic Panel; Future    Hyperlipidemia, mixed  -     Lipid Panel; Future    Rheumatoid arthritis involving multiple sites with positive rheumatoid factor ([facility]       There are no Patient Instructions on file for this visit. On this date 8/8/2022 I have spent 30 minutes reviewing previous notes, test results and face to face with the patient discussing the diagnosis and importance of compliance with the treatment plan as well as documenting on the day of the visit.       Marco Lee MD   8/8/22

## 2022-08-09 DIAGNOSIS — G47.33 OSA (OBSTRUCTIVE SLEEP APNEA): Primary | ICD-10-CM

## 2022-08-10 ENCOUNTER — TELEPHONE (OUTPATIENT)
Dept: SLEEP CENTER | Age: 52
End: 2022-08-10

## 2023-01-09 ENCOUNTER — COMMUNITY OUTREACH (OUTPATIENT)
Dept: PRIMARY CARE CLINIC | Age: 53
End: 2023-01-09

## 2023-05-18 DIAGNOSIS — J41.8 MIXED SIMPLE AND MUCOPURULENT CHRONIC BRONCHITIS (HCC): ICD-10-CM

## 2023-05-18 RX ORDER — BUDESONIDE AND FORMOTEROL FUMARATE DIHYDRATE 160; 4.5 UG/1; UG/1
AEROSOL RESPIRATORY (INHALATION)
Qty: 30.6 G | Refills: 1 | Status: SHIPPED | OUTPATIENT
Start: 2023-05-18

## 2023-05-18 RX ORDER — BUDESONIDE AND FORMOTEROL FUMARATE DIHYDRATE 160; 4.5 UG/1; UG/1
2 AEROSOL RESPIRATORY (INHALATION) 2 TIMES DAILY
Qty: 30.6 G | Refills: 1 | OUTPATIENT
Start: 2023-05-18

## 2023-06-02 ENCOUNTER — HOSPITAL ENCOUNTER (OUTPATIENT)
Age: 53
Discharge: HOME | End: 2023-06-02
Payer: MEDICAID

## 2023-06-02 ENCOUNTER — OFFICE VISIT (OUTPATIENT)
Dept: PRIMARY CARE CLINIC | Age: 53
End: 2023-06-02

## 2023-06-02 VITALS
HEIGHT: 62 IN | BODY MASS INDEX: 37.73 KG/M2 | WEIGHT: 205 LBS | DIASTOLIC BLOOD PRESSURE: 78 MMHG | OXYGEN SATURATION: 86 % | TEMPERATURE: 97.1 F | SYSTOLIC BLOOD PRESSURE: 136 MMHG | HEART RATE: 80 BPM

## 2023-06-02 DIAGNOSIS — R09.02 HYPOXIA: ICD-10-CM

## 2023-06-02 DIAGNOSIS — J44.1 COPD WITH ACUTE EXACERBATION (HCC): Primary | ICD-10-CM

## 2023-06-02 DIAGNOSIS — J41.0 SIMPLE CHRONIC BRONCHITIS (HCC): ICD-10-CM

## 2023-06-02 DIAGNOSIS — M05.79 RHEUMATOID ARTHRITIS INVOLVING MULTIPLE SITES WITH POSITIVE RHEUMATOID FACTOR (HCC): ICD-10-CM

## 2023-06-02 LAB
ALBUMIN SERPL-MCNC: 4 G/DL (ref 3.5–5.2)
ALP SERPL-CCNC: 93 U/L (ref 35–104)
ALT SERPL-CCNC: 17 U/L (ref 0–32)
ANION GAP SERPL CALCULATED.3IONS-SCNC: 10 MMOL/L (ref 7–16)
AST SERPL-CCNC: 15 U/L (ref 0–31)
BASOPHILS # BLD: 0.04 E9/L (ref 0–0.2)
BASOPHILS NFR BLD: 0.6 % (ref 0–2)
BILIRUB SERPL-MCNC: 0.3 MG/DL (ref 0–1.2)
BUN SERPL-MCNC: 9 MG/DL (ref 6–20)
CALCIUM SERPL-MCNC: 8.9 MG/DL (ref 8.6–10.2)
CHLORIDE SERPL-SCNC: 102 MMOL/L (ref 98–107)
CO2 SERPL-SCNC: 27 MMOL/L (ref 22–29)
CREAT SERPL-MCNC: 0.6 MG/DL (ref 0.5–1)
D DIMER: 2724 NG/ML DDU
EOSINOPHIL # BLD: 0 E9/L (ref 0.05–0.5)
EOSINOPHIL NFR BLD: 0 % (ref 0–6)
ERYTHROCYTE [DISTWIDTH] IN BLOOD BY AUTOMATED COUNT: 14.6 FL (ref 11.5–15)
GLUCOSE SERPL-MCNC: 85 MG/DL (ref 74–99)
HCT VFR BLD AUTO: 52.3 % (ref 34–48)
HGB BLD-MCNC: 16.3 G/DL (ref 11.5–15.5)
IMM GRANULOCYTES # BLD: 0.01 E9/L
IMM GRANULOCYTES NFR BLD: 0.1 % (ref 0–5)
LYMPHOCYTES # BLD: 1.57 E9/L (ref 1.5–4)
LYMPHOCYTES NFR BLD: 22.3 % (ref 20–42)
Lab: NORMAL
MCH RBC QN AUTO: 27.3 PG (ref 26–35)
MCHC RBC AUTO-ENTMCNC: 31.2 % (ref 32–34.5)
MCV RBC AUTO: 87.6 FL (ref 80–99.9)
MONOCYTES # BLD: 0.78 E9/L (ref 0.1–0.95)
MONOCYTES NFR BLD: 11.1 % (ref 2–12)
NEUTROPHILS # BLD: 4.63 E9/L (ref 1.8–7.3)
NEUTS SEG NFR BLD: 65.9 % (ref 43–80)
PERFORMING INSTRUMENT: NORMAL
PLATELET # BLD AUTO: 235 E9/L (ref 130–450)
PMV BLD AUTO: 12.3 FL (ref 7–12)
POTASSIUM SERPL-SCNC: 3.8 MMOL/L (ref 3.5–5)
PROT SERPL-MCNC: 7.1 G/DL (ref 6.4–8.3)
QC PASS/FAIL: NORMAL
RBC # BLD AUTO: 5.97 E12/L (ref 3.5–5.5)
SARS-COV-2, POC: NORMAL
SODIUM SERPL-SCNC: 139 MMOL/L (ref 132–146)
WBC # BLD: 7 E9/L (ref 4.5–11.5)

## 2023-06-02 PROCEDURE — 80053 COMPREHEN METABOLIC PANEL: CPT

## 2023-06-02 PROCEDURE — 85025 COMPLETE CBC W/AUTO DIFF WBC: CPT

## 2023-06-02 PROCEDURE — 85378 FIBRIN DEGRADE SEMIQUANT: CPT

## 2023-06-02 PROCEDURE — 36415 COLL VENOUS BLD VENIPUNCTURE: CPT

## 2023-06-02 RX ORDER — METHYLPREDNISOLONE 4 MG/1
TABLET ORAL
Qty: 1 KIT | Refills: 0 | Status: SHIPPED | OUTPATIENT
Start: 2023-06-02 | End: 2023-06-08

## 2023-06-02 RX ORDER — ALBUTEROL SULFATE 90 UG/1
2 AEROSOL, METERED RESPIRATORY (INHALATION) 4 TIMES DAILY PRN
Qty: 54 G | Refills: 1 | Status: SHIPPED | OUTPATIENT
Start: 2023-06-02

## 2023-06-02 RX ORDER — AMOXICILLIN AND CLAVULANATE POTASSIUM 875; 125 MG/1; MG/1
1 TABLET, FILM COATED ORAL 2 TIMES DAILY
Qty: 20 TABLET | Refills: 0 | Status: SHIPPED | OUTPATIENT
Start: 2023-06-02 | End: 2023-06-12

## 2023-06-02 SDOH — ECONOMIC STABILITY: INCOME INSECURITY: HOW HARD IS IT FOR YOU TO PAY FOR THE VERY BASICS LIKE FOOD, HOUSING, MEDICAL CARE, AND HEATING?: NOT HARD AT ALL

## 2023-06-02 SDOH — ECONOMIC STABILITY: HOUSING INSECURITY
IN THE LAST 12 MONTHS, WAS THERE A TIME WHEN YOU DID NOT HAVE A STEADY PLACE TO SLEEP OR SLEPT IN A SHELTER (INCLUDING NOW)?: NO

## 2023-06-02 SDOH — ECONOMIC STABILITY: FOOD INSECURITY: WITHIN THE PAST 12 MONTHS, YOU WORRIED THAT YOUR FOOD WOULD RUN OUT BEFORE YOU GOT MONEY TO BUY MORE.: NEVER TRUE

## 2023-06-02 SDOH — ECONOMIC STABILITY: FOOD INSECURITY: WITHIN THE PAST 12 MONTHS, THE FOOD YOU BOUGHT JUST DIDN'T LAST AND YOU DIDN'T HAVE MONEY TO GET MORE.: NEVER TRUE

## 2023-06-02 ASSESSMENT — PATIENT HEALTH QUESTIONNAIRE - PHQ9
SUM OF ALL RESPONSES TO PHQ QUESTIONS 1-9: 0
SUM OF ALL RESPONSES TO PHQ QUESTIONS 1-9: 0
SUM OF ALL RESPONSES TO PHQ9 QUESTIONS 1 & 2: 0
2. FEELING DOWN, DEPRESSED OR HOPELESS: 0
SUM OF ALL RESPONSES TO PHQ QUESTIONS 1-9: 0
SUM OF ALL RESPONSES TO PHQ QUESTIONS 1-9: 0
1. LITTLE INTEREST OR PLEASURE IN DOING THINGS: 0

## 2023-06-02 ASSESSMENT — ANXIETY QUESTIONNAIRES
3. WORRYING TOO MUCH ABOUT DIFFERENT THINGS: 0
5. BEING SO RESTLESS THAT IT IS HARD TO SIT STILL: 0
1. FEELING NERVOUS, ANXIOUS, OR ON EDGE: 0
4. TROUBLE RELAXING: 0
IF YOU CHECKED OFF ANY PROBLEMS ON THIS QUESTIONNAIRE, HOW DIFFICULT HAVE THESE PROBLEMS MADE IT FOR YOU TO DO YOUR WORK, TAKE CARE OF THINGS AT HOME, OR GET ALONG WITH OTHER PEOPLE: NOT DIFFICULT AT ALL
6. BECOMING EASILY ANNOYED OR IRRITABLE: 0
2. NOT BEING ABLE TO STOP OR CONTROL WORRYING: 0
7. FEELING AFRAID AS IF SOMETHING AWFUL MIGHT HAPPEN: 0
GAD7 TOTAL SCORE: 0

## 2023-06-02 NOTE — PROGRESS NOTES
Future    Rheumatoid arthritis involving multiple sites with positive rheumatoid factor (HCC)    Other orders  -     amoxicillin-clavulanate (AUGMENTIN) 875-125 MG per tablet; Take 1 tablet by mouth 2 times daily for 10 days  -     methylPREDNISolone (MEDROL DOSEPACK) 4 MG tablet; Take by mouth.  -     albuterol sulfate HFA (VENTOLIN HFA) 108 (90 Base) MCG/ACT inhaler; Inhale 2 puffs into the lungs 4 times daily as needed for Wheezing         There are no Patient Instructions on file for this visit. On this date 6/2/2023 I have spent 25 minutes reviewing previous notes, test results and face to face with the patient discussing the diagnosis and importance of compliance with the treatment plan as well as documenting on the day of the visit.       Jodi Carmen MD   6/2/23

## 2023-06-05 DIAGNOSIS — J44.1 COPD WITH ACUTE EXACERBATION (HCC): ICD-10-CM

## 2023-06-05 DIAGNOSIS — R09.02 HYPOXIA: Primary | ICD-10-CM

## 2024-01-16 DIAGNOSIS — J41.8 MIXED SIMPLE AND MUCOPURULENT CHRONIC BRONCHITIS (HCC): ICD-10-CM

## 2024-01-16 RX ORDER — BUDESONIDE AND FORMOTEROL FUMARATE DIHYDRATE 160; 4.5 UG/1; UG/1
AEROSOL RESPIRATORY (INHALATION)
Qty: 30.6 G | Refills: 1 | Status: SHIPPED | OUTPATIENT
Start: 2024-01-16

## 2024-08-05 DIAGNOSIS — J44.1 COPD WITH ACUTE EXACERBATION (HCC): ICD-10-CM

## 2024-08-05 DIAGNOSIS — R09.02 HYPOXIA: ICD-10-CM

## 2024-08-05 RX ORDER — ALBUTEROL SULFATE 90 UG/1
2 AEROSOL, METERED RESPIRATORY (INHALATION) 4 TIMES DAILY PRN
Qty: 54 G | Refills: 1 | Status: SHIPPED | OUTPATIENT
Start: 2024-08-05

## 2024-09-23 ENCOUNTER — TELEPHONE (OUTPATIENT)
Dept: PRIMARY CARE CLINIC | Age: 54
End: 2024-09-23

## 2024-09-23 DIAGNOSIS — J41.8 MIXED SIMPLE AND MUCOPURULENT CHRONIC BRONCHITIS (HCC): ICD-10-CM

## 2024-09-23 RX ORDER — BUDESONIDE AND FORMOTEROL FUMARATE DIHYDRATE 160; 4.5 UG/1; UG/1
2 AEROSOL RESPIRATORY (INHALATION) DAILY
Qty: 3 EACH | Refills: 0 | Status: SHIPPED | OUTPATIENT
Start: 2024-09-23 | End: 2024-12-22

## 2025-02-27 DIAGNOSIS — J41.8 MIXED SIMPLE AND MUCOPURULENT CHRONIC BRONCHITIS (HCC): ICD-10-CM

## 2025-02-28 NOTE — TELEPHONE ENCOUNTER
Name of Medication(s) Requested:  Requested Prescriptions     Pending Prescriptions Disp Refills    budesonide-formoterol (SYMBICORT) 160-4.5 MCG/ACT AERO [Pharmacy Med Name: Budesonide-Formoterol Fumarate Inhalation Aerosol 160-4.5 MCG/ACT] 30.6 g 0     Sig: INHALE 2 PUFFS BY MOUTH INTO THE LUNGS DAILY       Medication is on current medication list Yes    Dosage and directions were verified? Yes    Quantity verified: 30 day supply     Pharmacy Verified?  Yes    Last Appointment:  6/13/2023    Future appts:  No future appointments.     (If no appt send self scheduling link. .REFILLAPPT)  Scheduling request sent?     [] Yes  [] No    Does patient need updated?  [] Yes  [x] No

## 2025-03-10 ENCOUNTER — TELEPHONE (OUTPATIENT)
Dept: PRIMARY CARE CLINIC | Age: 55
End: 2025-03-10

## 2025-03-19 ENCOUNTER — APPOINTMENT (OUTPATIENT)
Dept: GENERAL RADIOLOGY | Age: 55
End: 2025-03-19
Payer: COMMERCIAL

## 2025-03-19 ENCOUNTER — APPOINTMENT (OUTPATIENT)
Dept: CT IMAGING | Age: 55
End: 2025-03-19
Payer: COMMERCIAL

## 2025-03-19 ENCOUNTER — HOSPITAL ENCOUNTER (EMERGENCY)
Age: 55
Discharge: HOME | End: 2025-03-19
Attending: EMERGENCY MEDICINE
Payer: COMMERCIAL

## 2025-03-19 ENCOUNTER — TELEPHONE (OUTPATIENT)
Dept: SURGERY | Age: 55
End: 2025-03-19

## 2025-03-19 VITALS
WEIGHT: 206.1 LBS | DIASTOLIC BLOOD PRESSURE: 80 MMHG | BODY MASS INDEX: 36.52 KG/M2 | OXYGEN SATURATION: 90 % | HEART RATE: 88 BPM | TEMPERATURE: 98.3 F | RESPIRATION RATE: 18 BRPM | SYSTOLIC BLOOD PRESSURE: 151 MMHG | HEIGHT: 63 IN

## 2025-03-19 DIAGNOSIS — K80.20 SYMPTOMATIC CHOLELITHIASIS: Primary | ICD-10-CM

## 2025-03-19 DIAGNOSIS — K80.20 SYMPTOMATIC CHOLELITHIASIS: ICD-10-CM

## 2025-03-19 LAB
ALBUMIN SERPL-MCNC: 4.1 G/DL (ref 3.5–5.2)
ALP SERPL-CCNC: 78 U/L (ref 35–104)
ALT SERPL-CCNC: 16 U/L (ref 0–32)
ANION GAP SERPL CALCULATED.3IONS-SCNC: 8 MMOL/L (ref 7–16)
AST SERPL-CCNC: 16 U/L (ref 0–31)
BASOPHILS # BLD: 0 K/UL (ref 0–0.2)
BASOPHILS NFR BLD: 0 % (ref 0–2)
BILIRUB SERPL-MCNC: 1.2 MG/DL (ref 0–1.2)
BUN SERPL-MCNC: 10 MG/DL (ref 6–20)
CALCIUM SERPL-MCNC: 9.7 MG/DL (ref 8.6–10.2)
CHLORIDE SERPL-SCNC: 100 MMOL/L (ref 98–107)
CO2 SERPL-SCNC: 29 MMOL/L (ref 22–29)
CREAT SERPL-MCNC: 0.6 MG/DL (ref 0.5–1)
EOSINOPHIL # BLD: 0.53 K/UL (ref 0.05–0.5)
EOSINOPHILS RELATIVE PERCENT: 6 % (ref 0–6)
ERYTHROCYTE [DISTWIDTH] IN BLOOD BY AUTOMATED COUNT: 17.6 % (ref 11.5–15)
GFR, ESTIMATED: >90 ML/MIN/1.73M2
GLUCOSE SERPL-MCNC: 103 MG/DL (ref 74–99)
HCT VFR BLD AUTO: 61.1 % (ref 34–48)
HGB BLD-MCNC: 19.3 G/DL (ref 11.5–15.5)
LACTATE BLDV-SCNC: 0.8 MMOL/L (ref 0.5–2.2)
LIPASE SERPL-CCNC: 19 U/L (ref 13–60)
LYMPHOCYTES NFR BLD: 1.76 K/UL (ref 1.5–4)
LYMPHOCYTES RELATIVE PERCENT: 20 % (ref 20–42)
MAGNESIUM SERPL-MCNC: 2 MG/DL (ref 1.6–2.6)
MCH RBC QN AUTO: 28.1 PG (ref 26–35)
MCHC RBC AUTO-ENTMCNC: 31.6 G/DL (ref 32–34.5)
MCV RBC AUTO: 89.1 FL (ref 80–99.9)
MONOCYTES NFR BLD: 1.14 K/UL (ref 0.1–0.95)
MONOCYTES NFR BLD: 13 % (ref 2–12)
NEUTROPHILS NFR BLD: 61 % (ref 43–80)
NEUTS SEG NFR BLD: 5.37 K/UL (ref 1.8–7.3)
PLATELET # BLD AUTO: 201 K/UL (ref 130–450)
PMV BLD AUTO: 12.2 FL (ref 7–12)
POTASSIUM SERPL-SCNC: 4.1 MMOL/L (ref 3.5–5)
PROT SERPL-MCNC: 7.4 G/DL (ref 6.4–8.3)
RBC # BLD AUTO: 6.86 M/UL (ref 3.5–5.5)
RBC # BLD: ABNORMAL 10*6/UL
RBC # BLD: ABNORMAL 10*6/UL
SODIUM SERPL-SCNC: 137 MMOL/L (ref 132–146)
TROPONIN I SERPL HS-MCNC: 7 NG/L (ref 0–9)
WBC OTHER # BLD: 8.8 K/UL (ref 4.5–11.5)

## 2025-03-19 PROCEDURE — 6360000004 HC RX CONTRAST MEDICATION: Performed by: RADIOLOGY

## 2025-03-19 PROCEDURE — 6370000000 HC RX 637 (ALT 250 FOR IP)

## 2025-03-19 PROCEDURE — 74177 CT ABD & PELVIS W/CONTRAST: CPT

## 2025-03-19 PROCEDURE — 83605 ASSAY OF LACTIC ACID: CPT

## 2025-03-19 PROCEDURE — 83735 ASSAY OF MAGNESIUM: CPT

## 2025-03-19 PROCEDURE — 83690 ASSAY OF LIPASE: CPT

## 2025-03-19 PROCEDURE — 80053 COMPREHEN METABOLIC PANEL: CPT

## 2025-03-19 PROCEDURE — 99285 EMERGENCY DEPT VISIT HI MDM: CPT

## 2025-03-19 PROCEDURE — 84484 ASSAY OF TROPONIN QUANT: CPT

## 2025-03-19 PROCEDURE — 2500000003 HC RX 250 WO HCPCS

## 2025-03-19 PROCEDURE — 2580000003 HC RX 258

## 2025-03-19 PROCEDURE — 71045 X-RAY EXAM CHEST 1 VIEW: CPT

## 2025-03-19 PROCEDURE — 96374 THER/PROPH/DIAG INJ IV PUSH: CPT

## 2025-03-19 PROCEDURE — 85025 COMPLETE CBC W/AUTO DIFF WBC: CPT

## 2025-03-19 PROCEDURE — 93005 ELECTROCARDIOGRAM TRACING: CPT

## 2025-03-19 PROCEDURE — 99222 1ST HOSP IP/OBS MODERATE 55: CPT | Performed by: SURGERY

## 2025-03-19 RX ORDER — 0.9 % SODIUM CHLORIDE 0.9 %
1000 INTRAVENOUS SOLUTION INTRAVENOUS ONCE
Status: COMPLETED | OUTPATIENT
Start: 2025-03-19 | End: 2025-03-19

## 2025-03-19 RX ORDER — CEFDINIR 300 MG/1
300 CAPSULE ORAL 2 TIMES DAILY
Qty: 14 CAPSULE | Refills: 0 | Status: SHIPPED | OUTPATIENT
Start: 2025-03-19 | End: 2025-03-26

## 2025-03-19 RX ORDER — IOPAMIDOL 755 MG/ML
75 INJECTION, SOLUTION INTRAVASCULAR
Status: COMPLETED | OUTPATIENT
Start: 2025-03-19 | End: 2025-03-19

## 2025-03-19 RX ORDER — METRONIDAZOLE 500 MG/1
500 TABLET ORAL 2 TIMES DAILY
Qty: 14 TABLET | Refills: 0 | Status: SHIPPED | OUTPATIENT
Start: 2025-03-19 | End: 2025-03-26

## 2025-03-19 RX ORDER — SUCRALFATE 1 G/1
1 TABLET ORAL ONCE
Status: COMPLETED | OUTPATIENT
Start: 2025-03-19 | End: 2025-03-19

## 2025-03-19 RX ADMIN — SUCRALFATE 1 G: 1 TABLET ORAL at 10:16

## 2025-03-19 RX ADMIN — LIDOCAINE HYDROCHLORIDE: 20 SOLUTION ORAL at 10:15

## 2025-03-19 RX ADMIN — IOPAMIDOL 75 ML: 755 INJECTION, SOLUTION INTRAVENOUS at 11:58

## 2025-03-19 RX ADMIN — SODIUM CHLORIDE 1000 ML: 0.9 INJECTION, SOLUTION INTRAVENOUS at 10:13

## 2025-03-19 RX ADMIN — FAMOTIDINE 20 MG: 10 INJECTION, SOLUTION INTRAVENOUS at 10:16

## 2025-03-19 ASSESSMENT — PAIN DESCRIPTION - FREQUENCY: FREQUENCY: CONTINUOUS

## 2025-03-19 ASSESSMENT — PAIN DESCRIPTION - ORIENTATION: ORIENTATION: LEFT

## 2025-03-19 ASSESSMENT — PAIN - FUNCTIONAL ASSESSMENT: PAIN_FUNCTIONAL_ASSESSMENT: 0-10

## 2025-03-19 ASSESSMENT — PAIN DESCRIPTION - PAIN TYPE: TYPE: ACUTE PAIN

## 2025-03-19 ASSESSMENT — PAIN SCALES - GENERAL: PAINLEVEL_OUTOF10: 6

## 2025-03-19 ASSESSMENT — PAIN DESCRIPTION - DESCRIPTORS: DESCRIPTORS: SORE;SQUEEZING;PRESSURE

## 2025-03-19 ASSESSMENT — PAIN DESCRIPTION - LOCATION: LOCATION: ABDOMEN;BACK

## 2025-03-19 NOTE — CONSULTS
[facility]  Temo Foy MD  3/19/2025    Physician Consulted:  Temo Foy MD  Reason for Consult: Generalized abdominal pain  Referring Physician: Dr. Yuen     HPI  Cintia Rosa is a 54 y.o. female who presents for evaluation of abdominal pain send there was generalized and intermittent however was most severe in her right upper quadrant.  She states this started after eating [employer] yesterday she denies any nausea vomiting fevers or chills.  She states since being in the hospital her abdominal pain has improved.  Her past surgical history includes a  and 89 and tubal ligation.  She denies any blood thinners.      Past Medical History:   Diagnosis Date    Arthritis, rheumatic, acute or subacute     COPD (chronic obstructive pulmonary disease) (HCC)     Rheumatoid arthritis involving multiple sites with positive rheumatoid factor (HCC) 2022       Past Surgical History:   Procedure Laterality Date     SECTION      TUBAL LIGATION Bilateral        Medications Prior to Admission    Prior to Admission medications    Medication Sig Start Date End Date Taking? Authorizing Provider   cefdinir (OMNICEF) 300 MG capsule Take 1 capsule by mouth 2 times daily for 7 days 3/19/25 3/26/25 Yes Priya Yuen DO   metroNIDAZOLE (FLAGYL) 500 MG tablet Take 1 tablet by mouth 2 times daily for 7 days 3/19/25 3/26/25 Yes Priya Yuen DO   budesonide-formoterol (SYMBICORT) 160-4.5 MCG/ACT AERO Inhale 2 puffs into the lungs daily 9/23/24 3/19/25 Yes Christy Howell MD   albuterol sulfate HFA (VENTOLIN HFA) 108 (90 Base) MCG/ACT inhaler Inhale 2 puffs into the lungs 4 times daily as needed for Wheezing 24  Yes Christy Howell MD       No Known Allergies    Family History   Problem Relation Age of Onset    Lung Cancer Father     Cervical Cancer Sister     Colon Cancer Maternal Grandfather        Social History     Tobacco Use    Smoking status: Every Day     Current packs/day:

## 2025-03-19 NOTE — H&P (VIEW-ONLY)
[facility]  Temo Foy MD  3/19/2025    Physician Consulted:  Temo Foy MD  Reason for Consult: Generalized abdominal pain  Referring Physician: Dr. Yuen     HPI  Cintia Rosa is a 54 y.o. female who presents for evaluation of abdominal pain send there was generalized and intermittent however was most severe in her right upper quadrant.  She states this started after eating [employer] yesterday she denies any nausea vomiting fevers or chills.  She states since being in the hospital her abdominal pain has improved.  Her past surgical history includes a  and 89 and tubal ligation.  She denies any blood thinners.      Past Medical History:   Diagnosis Date    Arthritis, rheumatic, acute or subacute     COPD (chronic obstructive pulmonary disease) (HCC)     Rheumatoid arthritis involving multiple sites with positive rheumatoid factor (HCC) 2022       Past Surgical History:   Procedure Laterality Date     SECTION      TUBAL LIGATION Bilateral        Medications Prior to Admission    Prior to Admission medications    Medication Sig Start Date End Date Taking? Authorizing Provider   cefdinir (OMNICEF) 300 MG capsule Take 1 capsule by mouth 2 times daily for 7 days 3/19/25 3/26/25 Yes Priya Yuen DO   metroNIDAZOLE (FLAGYL) 500 MG tablet Take 1 tablet by mouth 2 times daily for 7 days 3/19/25 3/26/25 Yes Priya Yuen DO   budesonide-formoterol (SYMBICORT) 160-4.5 MCG/ACT AERO Inhale 2 puffs into the lungs daily 9/23/24 3/19/25 Yes Christy Howell MD   albuterol sulfate HFA (VENTOLIN HFA) 108 (90 Base) MCG/ACT inhaler Inhale 2 puffs into the lungs 4 times daily as needed for Wheezing 24  Yes Christy Howell MD       No Known Allergies    Family History   Problem Relation Age of Onset    Lung Cancer Father     Cervical Cancer Sister     Colon Cancer Maternal Grandfather        Social History     Tobacco Use    Smoking status: Every Day     Current packs/day:

## 2025-03-19 NOTE — ED NOTES
Patient has been placed in a non monitored area at this time.  Physician and charge nurse aware.  Patient will be placed on a monitor when roomed.

## 2025-03-19 NOTE — ED PROVIDER NOTES
Department of Emergency Medicine   ED Provider Note  Admit Date/RoomTime: 3/19/2025  9:22 AM  ED Room:           History of Present Illness:  3/19/25, Time: 9:41 AM EDT      Patient is a 54 y.o. female presents with a chief complaint of epigastric abd pain  This has been occurring since yesterday.  Patient states that it gets better with nothing.  Patient states that it gets worse with nothing.  Patient states that it is severe in severity.  Patient states it was acute in onset.  She notes she started having the pain after eating [employer] yesterday.  It has been ongoing since.  Does have some nausea with no vomiting.  No fevers or chills, cough or congestion, shortness of breath, chest pain, lightheaded or dizziness, numbness or tingling.  Notes seem to be mainly epigastric and left-sided and then wrapping around towards her back.  No constipation, diarrhea, dysuria, hematuria, vaginal bleeding or discharge, melena, hematochezia.    Review of Systems:     Pertinent positives and negatives are stated within HPI.      --------------------------------------------- PAST HISTORY ---------------------------------------------  Past Medical History:  has a past medical history of Arthritis, rheumatic, acute or subacute, COPD (chronic obstructive pulmonary disease) (HCC), and Rheumatoid arthritis involving multiple sites with positive rheumatoid factor (HCC).    Past Surgical History:  has a past surgical history that includes  section and Tubal ligation (Bilateral).    Social History:  reports that she has been smoking cigarettes. She started smoking about 41 years ago. She has a 20.6 pack-year smoking history. She has never used smokeless tobacco. She reports that she does not drink alcohol and does not use drugs.    Family History: family history includes Cervical Cancer in her sister; Colon Cancer in her maternal grandfather; Lung Cancer in her father.     The patient’s home medications have been

## 2025-03-20 DIAGNOSIS — J41.8 MIXED SIMPLE AND MUCOPURULENT CHRONIC BRONCHITIS (HCC): ICD-10-CM

## 2025-03-20 LAB
EKG ATRIAL RATE: 65 BPM
EKG P AXIS: 67 DEGREES
EKG P-R INTERVAL: 140 MS
EKG Q-T INTERVAL: 408 MS
EKG QRS DURATION: 86 MS
EKG QTC CALCULATION (BAZETT): 424 MS
EKG R AXIS: -62 DEGREES
EKG T AXIS: 56 DEGREES
EKG VENTRICULAR RATE: 65 BPM

## 2025-03-20 PROCEDURE — 93010 ELECTROCARDIOGRAM REPORT: CPT | Performed by: INTERNAL MEDICINE

## 2025-03-20 RX ORDER — BUDESONIDE AND FORMOTEROL FUMARATE DIHYDRATE 160; 4.5 UG/1; UG/1
AEROSOL RESPIRATORY (INHALATION)
Qty: 30.6 G | Refills: 0 | OUTPATIENT
Start: 2025-03-20

## 2025-03-20 RX ORDER — IBUPROFEN 200 MG
400 TABLET ORAL EVERY 6 HOURS PRN
COMMUNITY

## 2025-03-20 RX ORDER — BUDESONIDE AND FORMOTEROL FUMARATE DIHYDRATE 160; 4.5 UG/1; UG/1
2 AEROSOL RESPIRATORY (INHALATION) DAILY
Qty: 1 EACH | Refills: 0 | Status: SHIPPED | OUTPATIENT
Start: 2025-03-20 | End: 2025-09-16

## 2025-03-20 NOTE — PROGRESS NOTES
[facility] [place]                                                                                                                    PRE OP INSTRUCTIONS FOR  Cintia Rosa        Date: 3/20/2025    Date of surgery: 3/21/25   Arrival Time: Hospital will call you between 5pm and 7pm with your final arrival time for surgery. Go to front of hospital and check in at information desk.    Nothing by mouth (NPO) as instructed. May have clear liquids up to 2 hours prior to surgery. Nothing solid after midnight. Examples: water, apple juice, black coffee, plain tea    Take the following medications with a small sip of water on the morning of Surgery: inhaler and bring rescue inhaler     Diabetics may take half the evening dose of insulin but none after midnight.  If you feel symptomatic or have low blood sugar morning of surgery drink 1-2 ounces of apple juice only. If you take a weekly insulin injection _______________, stop 7 days prior to surgery. If you take _______________, stop 3-4 days prior to surgery.    Aspirin, Ibuprofen, Advil, Naproxen, other Anti-inflammatory products should be stopped before surgery as directed by your surgeon, cardiologist, or primary care Doctor. Herbal supplements and Vitamin E should be stopped five days prior.  May take Tylenol unless instructed otherwise by your surgeon.    Check with your Doctor regarding stopping Plavix, Coumadin, Lovenox, Eliquis, Effient, or other blood thinners such as, pradaxa, lixiana, xaralto and savaysa.    Do not smoke, chew tobacco, vape, or use illicit drugs and do not drink any alcoholic beverages 24 hours prior to surgery.    You may brush your teeth the morning of surgery.      You MUST make arrangements for a responsible adult, 18 and over, to take you home after your surgery. You will not be allowed to leave alone or drive yourself home.  You will need someone stay with you the first 24 hrs. Your surgery will be cancelled if you do  call AMBULATORY CARE if you have any further questions.   Pre Admit Testing           [phone]     [facility] [phone]

## 2025-03-21 ENCOUNTER — ANESTHESIA (OUTPATIENT)
Dept: OPERATING ROOM | Age: 55
End: 2025-03-21
Payer: COMMERCIAL

## 2025-03-21 ENCOUNTER — ANESTHESIA EVENT (OUTPATIENT)
Dept: OPERATING ROOM | Age: 55
End: 2025-03-21
Payer: COMMERCIAL

## 2025-03-21 ENCOUNTER — HOSPITAL ENCOUNTER (OUTPATIENT)
Dept: GENERAL RADIOLOGY | Age: 55
Setting detail: OUTPATIENT SURGERY
Discharge: HOME | End: 2025-03-23
Attending: SURGERY
Payer: COMMERCIAL

## 2025-03-21 ENCOUNTER — HOSPITAL ENCOUNTER (OUTPATIENT)
Age: 55
Setting detail: OUTPATIENT SURGERY
Discharge: HOME | End: 2025-03-21
Attending: SURGERY | Admitting: SURGERY
Payer: COMMERCIAL

## 2025-03-21 VITALS
HEART RATE: 76 BPM | WEIGHT: 193 LBS | DIASTOLIC BLOOD PRESSURE: 70 MMHG | RESPIRATION RATE: 20 BRPM | SYSTOLIC BLOOD PRESSURE: 138 MMHG | TEMPERATURE: 97.5 F | BODY MASS INDEX: 34.2 KG/M2 | HEIGHT: 63 IN | OXYGEN SATURATION: 91 %

## 2025-03-21 DIAGNOSIS — K82.9 GALLBLADDER DISEASE: ICD-10-CM

## 2025-03-21 DIAGNOSIS — K80.20 SYMPTOMATIC CHOLELITHIASIS: ICD-10-CM

## 2025-03-21 DIAGNOSIS — G89.18 POST-OP PAIN: Primary | ICD-10-CM

## 2025-03-21 PROCEDURE — S2900 ROBOTIC SURGICAL SYSTEM: HCPCS | Performed by: SURGERY

## 2025-03-21 PROCEDURE — 3700000000 HC ANESTHESIA ATTENDED CARE: Performed by: SURGERY

## 2025-03-21 PROCEDURE — 2500000003 HC RX 250 WO HCPCS: Performed by: SURGERY

## 2025-03-21 PROCEDURE — 7100000011 HC PHASE II RECOVERY - ADDTL 15 MIN: Performed by: SURGERY

## 2025-03-21 PROCEDURE — 2709999900 HC NON-CHARGEABLE SUPPLY: Performed by: SURGERY

## 2025-03-21 PROCEDURE — 2500000003 HC RX 250 WO HCPCS

## 2025-03-21 PROCEDURE — 7100000001 HC PACU RECOVERY - ADDTL 15 MIN: Performed by: SURGERY

## 2025-03-21 PROCEDURE — 2580000003 HC RX 258: Performed by: ANESTHESIOLOGY

## 2025-03-21 PROCEDURE — 7100000000 HC PACU RECOVERY - FIRST 15 MIN: Performed by: SURGERY

## 2025-03-21 PROCEDURE — 6370000000 HC RX 637 (ALT 250 FOR IP): Performed by: SURGERY

## 2025-03-21 PROCEDURE — C1894 INTRO/SHEATH, NON-LASER: HCPCS | Performed by: SURGERY

## 2025-03-21 PROCEDURE — 6370000000 HC RX 637 (ALT 250 FOR IP): Performed by: ANESTHESIOLOGY

## 2025-03-21 PROCEDURE — 6360000002 HC RX W HCPCS: Performed by: ANESTHESIOLOGY

## 2025-03-21 PROCEDURE — 47562 LAPAROSCOPIC CHOLECYSTECTOMY: CPT | Performed by: SURGERY

## 2025-03-21 PROCEDURE — 3700000001 HC ADD 15 MINUTES (ANESTHESIA): Performed by: SURGERY

## 2025-03-21 PROCEDURE — 3600000019 HC SURGERY ROBOT ADDTL 15MIN: Performed by: SURGERY

## 2025-03-21 PROCEDURE — 3600000009 HC SURGERY ROBOT BASE: Performed by: SURGERY

## 2025-03-21 PROCEDURE — 6360000002 HC RX W HCPCS

## 2025-03-21 PROCEDURE — C1889 IMPLANT/INSERT DEVICE, NOC: HCPCS | Performed by: SURGERY

## 2025-03-21 PROCEDURE — 7100000010 HC PHASE II RECOVERY - FIRST 15 MIN: Performed by: SURGERY

## 2025-03-21 PROCEDURE — 94640 AIRWAY INHALATION TREATMENT: CPT

## 2025-03-21 PROCEDURE — 88304 TISSUE EXAM BY PATHOLOGIST: CPT

## 2025-03-21 DEVICE — CLIP INT M L POLYMER LOK LIG HEM O LOK: Type: IMPLANTABLE DEVICE | Status: FUNCTIONAL

## 2025-03-21 RX ORDER — ROCURONIUM BROMIDE 10 MG/ML
INJECTION, SOLUTION INTRAVENOUS
Status: DISCONTINUED | OUTPATIENT
Start: 2025-03-21 | End: 2025-03-21 | Stop reason: SDUPTHER

## 2025-03-21 RX ORDER — ONDANSETRON 2 MG/ML
4 INJECTION INTRAMUSCULAR; INTRAVENOUS
Status: DISCONTINUED | OUTPATIENT
Start: 2025-03-21 | End: 2025-03-21 | Stop reason: HOSPADM

## 2025-03-21 RX ORDER — BUDESONIDE AND FORMOTEROL FUMARATE DIHYDRATE 160; 4.5 UG/1; UG/1
AEROSOL RESPIRATORY (INHALATION)
Qty: 30.6 G | Refills: 0 | OUTPATIENT
Start: 2025-03-21

## 2025-03-21 RX ORDER — DEXAMETHASONE SODIUM PHOSPHATE 10 MG/ML
INJECTION, SOLUTION INTRAMUSCULAR; INTRAVENOUS
Status: DISCONTINUED | OUTPATIENT
Start: 2025-03-21 | End: 2025-03-21 | Stop reason: SDUPTHER

## 2025-03-21 RX ORDER — GLYCOPYRROLATE 0.2 MG/ML
INJECTION INTRAMUSCULAR; INTRAVENOUS
Status: DISCONTINUED | OUTPATIENT
Start: 2025-03-21 | End: 2025-03-21 | Stop reason: SDUPTHER

## 2025-03-21 RX ORDER — MEPERIDINE HYDROCHLORIDE 25 MG/ML
12.5 INJECTION INTRAMUSCULAR; INTRAVENOUS; SUBCUTANEOUS EVERY 5 MIN PRN
Status: DISCONTINUED | OUTPATIENT
Start: 2025-03-21 | End: 2025-03-21 | Stop reason: HOSPADM

## 2025-03-21 RX ORDER — ONDANSETRON 2 MG/ML
INJECTION INTRAMUSCULAR; INTRAVENOUS
Status: DISCONTINUED | OUTPATIENT
Start: 2025-03-21 | End: 2025-03-21 | Stop reason: SDUPTHER

## 2025-03-21 RX ORDER — SODIUM CHLORIDE 0.9 % (FLUSH) 0.9 %
5-40 SYRINGE (ML) INJECTION PRN
Status: DISCONTINUED | OUTPATIENT
Start: 2025-03-21 | End: 2025-03-21 | Stop reason: HOSPADM

## 2025-03-21 RX ORDER — MIDAZOLAM HYDROCHLORIDE 1 MG/ML
INJECTION, SOLUTION INTRAMUSCULAR; INTRAVENOUS
Status: DISCONTINUED | OUTPATIENT
Start: 2025-03-21 | End: 2025-03-21 | Stop reason: SDUPTHER

## 2025-03-21 RX ORDER — SODIUM CHLORIDE 0.9 % (FLUSH) 0.9 %
5-40 SYRINGE (ML) INJECTION EVERY 12 HOURS SCHEDULED
Status: DISCONTINUED | OUTPATIENT
Start: 2025-03-21 | End: 2025-03-21 | Stop reason: HOSPADM

## 2025-03-21 RX ORDER — MIDAZOLAM HYDROCHLORIDE 1 MG/ML
2 INJECTION, SOLUTION INTRAMUSCULAR; INTRAVENOUS
Status: DISCONTINUED | OUTPATIENT
Start: 2025-03-21 | End: 2025-03-21 | Stop reason: HOSPADM

## 2025-03-21 RX ORDER — IPRATROPIUM BROMIDE AND ALBUTEROL SULFATE 2.5; .5 MG/3ML; MG/3ML
1 SOLUTION RESPIRATORY (INHALATION)
Status: DISCONTINUED | OUTPATIENT
Start: 2025-03-21 | End: 2025-03-21 | Stop reason: HOSPADM

## 2025-03-21 RX ORDER — NEOSTIGMINE METHYLSULFATE 1 MG/ML
INJECTION, SOLUTION INTRAVENOUS
Status: DISCONTINUED | OUTPATIENT
Start: 2025-03-21 | End: 2025-03-21 | Stop reason: SDUPTHER

## 2025-03-21 RX ORDER — BUPIVACAINE HYDROCHLORIDE AND EPINEPHRINE 2.5; 5 MG/ML; UG/ML
INJECTION, SOLUTION EPIDURAL; INFILTRATION; INTRACAUDAL; PERINEURAL PRN
Status: DISCONTINUED | OUTPATIENT
Start: 2025-03-21 | End: 2025-03-21 | Stop reason: ALTCHOICE

## 2025-03-21 RX ORDER — HYDRALAZINE HYDROCHLORIDE 20 MG/ML
10 INJECTION INTRAMUSCULAR; INTRAVENOUS
Status: DISCONTINUED | OUTPATIENT
Start: 2025-03-21 | End: 2025-03-21 | Stop reason: HOSPADM

## 2025-03-21 RX ORDER — SODIUM CHLORIDE, SODIUM LACTATE, POTASSIUM CHLORIDE, CALCIUM CHLORIDE 600; 310; 30; 20 MG/100ML; MG/100ML; MG/100ML; MG/100ML
INJECTION, SOLUTION INTRAVENOUS CONTINUOUS
Status: DISCONTINUED | OUTPATIENT
Start: 2025-03-21 | End: 2025-03-21 | Stop reason: HOSPADM

## 2025-03-21 RX ORDER — NALOXONE HYDROCHLORIDE 0.4 MG/ML
INJECTION, SOLUTION INTRAMUSCULAR; INTRAVENOUS; SUBCUTANEOUS PRN
Status: DISCONTINUED | OUTPATIENT
Start: 2025-03-21 | End: 2025-03-21 | Stop reason: HOSPADM

## 2025-03-21 RX ORDER — FENTANYL CITRATE 50 UG/ML
INJECTION, SOLUTION INTRAMUSCULAR; INTRAVENOUS
Status: DISCONTINUED | OUTPATIENT
Start: 2025-03-21 | End: 2025-03-21 | Stop reason: SDUPTHER

## 2025-03-21 RX ORDER — LABETALOL HYDROCHLORIDE 5 MG/ML
10 INJECTION, SOLUTION INTRAVENOUS
Status: DISCONTINUED | OUTPATIENT
Start: 2025-03-21 | End: 2025-03-21 | Stop reason: HOSPADM

## 2025-03-21 RX ORDER — LIDOCAINE HYDROCHLORIDE 20 MG/ML
INJECTION, SOLUTION INTRAVENOUS
Status: DISCONTINUED | OUTPATIENT
Start: 2025-03-21 | End: 2025-03-21 | Stop reason: SDUPTHER

## 2025-03-21 RX ORDER — SODIUM CHLORIDE 9 MG/ML
INJECTION, SOLUTION INTRAVENOUS PRN
Status: DISCONTINUED | OUTPATIENT
Start: 2025-03-21 | End: 2025-03-21 | Stop reason: HOSPADM

## 2025-03-21 RX ORDER — INDOCYANINE GREEN AND WATER 25 MG
5 KIT INJECTION ONCE
Status: COMPLETED | OUTPATIENT
Start: 2025-03-21 | End: 2025-03-21

## 2025-03-21 RX ORDER — OXYCODONE HYDROCHLORIDE 5 MG/1
5 TABLET ORAL EVERY 6 HOURS PRN
Qty: 12 TABLET | Refills: 0 | Status: SHIPPED | OUTPATIENT
Start: 2025-03-21 | End: 2025-03-24

## 2025-03-21 RX ORDER — PROPOFOL 10 MG/ML
INJECTION, EMULSION INTRAVENOUS
Status: DISCONTINUED | OUTPATIENT
Start: 2025-03-21 | End: 2025-03-21 | Stop reason: SDUPTHER

## 2025-03-21 RX ORDER — IPRATROPIUM BROMIDE AND ALBUTEROL SULFATE 2.5; .5 MG/3ML; MG/3ML
1 SOLUTION RESPIRATORY (INHALATION) ONCE
Status: COMPLETED | OUTPATIENT
Start: 2025-03-21 | End: 2025-03-21

## 2025-03-21 RX ADMIN — INDOCYANINE GREEN AND WATER 5 MG: KIT at 12:39

## 2025-03-21 RX ADMIN — IPRATROPIUM BROMIDE AND ALBUTEROL SULFATE 1 DOSE: .5; 2.5 SOLUTION RESPIRATORY (INHALATION) at 13:26

## 2025-03-21 RX ADMIN — CEFAZOLIN SODIUM 2000 MG: 1 POWDER, FOR SOLUTION INTRAMUSCULAR; INTRAVENOUS at 13:43

## 2025-03-21 RX ADMIN — ONDANSETRON 4 MG: 2 INJECTION INTRAMUSCULAR; INTRAVENOUS at 14:59

## 2025-03-21 RX ADMIN — ROCURONIUM BROMIDE 40 MG: 10 SOLUTION INTRAVENOUS at 13:45

## 2025-03-21 RX ADMIN — MIDAZOLAM 2 MG: 1 INJECTION INTRAMUSCULAR; INTRAVENOUS at 13:38

## 2025-03-21 RX ADMIN — HYDROMORPHONE HYDROCHLORIDE 0.25 MG: 1 INJECTION, SOLUTION INTRAMUSCULAR; INTRAVENOUS; SUBCUTANEOUS at 16:16

## 2025-03-21 RX ADMIN — ROCURONIUM BROMIDE 10 MG: 10 SOLUTION INTRAVENOUS at 14:43

## 2025-03-21 RX ADMIN — FENTANYL CITRATE 50 MCG: 50 INJECTION, SOLUTION INTRAMUSCULAR; INTRAVENOUS at 13:52

## 2025-03-21 RX ADMIN — HYDROMORPHONE HYDROCHLORIDE 0.25 MG: 1 INJECTION, SOLUTION INTRAMUSCULAR; INTRAVENOUS; SUBCUTANEOUS at 16:04

## 2025-03-21 RX ADMIN — SODIUM CHLORIDE, SODIUM LACTATE, POTASSIUM CHLORIDE, AND CALCIUM CHLORIDE: .6; .31; .03; .02 INJECTION, SOLUTION INTRAVENOUS at 12:37

## 2025-03-21 RX ADMIN — Medication 3 MG: at 15:06

## 2025-03-21 RX ADMIN — GLYCOPYRROLATE 0.6 MG: 0.2 INJECTION, SOLUTION INTRAMUSCULAR; INTRAVENOUS at 15:06

## 2025-03-21 RX ADMIN — FENTANYL CITRATE 50 MCG: 50 INJECTION, SOLUTION INTRAMUSCULAR; INTRAVENOUS at 14:49

## 2025-03-21 RX ADMIN — LIDOCAINE HYDROCHLORIDE 100 MG: 20 INJECTION, SOLUTION INTRAVENOUS at 13:45

## 2025-03-21 RX ADMIN — DEXAMETHASONE SODIUM PHOSPHATE 10 MG: 10 INJECTION, SOLUTION INTRAMUSCULAR; INTRAVENOUS at 13:56

## 2025-03-21 RX ADMIN — FENTANYL CITRATE 100 MCG: 50 INJECTION, SOLUTION INTRAMUSCULAR; INTRAVENOUS at 13:45

## 2025-03-21 RX ADMIN — PROPOFOL 150 MG: 10 INJECTION, EMULSION INTRAVENOUS at 13:45

## 2025-03-21 ASSESSMENT — PAIN - FUNCTIONAL ASSESSMENT
PAIN_FUNCTIONAL_ASSESSMENT: 0-10
PAIN_FUNCTIONAL_ASSESSMENT: ACTIVITIES ARE NOT PREVENTED

## 2025-03-21 ASSESSMENT — PAIN SCALES - GENERAL
PAINLEVEL_OUTOF10: 5
PAINLEVEL_OUTOF10: 5

## 2025-03-21 ASSESSMENT — PAIN DESCRIPTION - DESCRIPTORS
DESCRIPTORS: DISCOMFORT
DESCRIPTORS: ACHING
DESCRIPTORS: ACHING

## 2025-03-21 ASSESSMENT — PAIN DESCRIPTION - LOCATION: LOCATION: ABDOMEN

## 2025-03-21 ASSESSMENT — LIFESTYLE VARIABLES: SMOKING_STATUS: 1

## 2025-03-21 NOTE — ANESTHESIA POSTPROCEDURE EVALUATION
Department of Anesthesiology  Postprocedure Note    Patient: Cintia Rosa  MRN: [id number]  YOB: 1970  Date of evaluation: 3/21/2025    Procedure Summary       Date: 03/21/25 Room / Location: [address]    Anesthesia Start: 1328 Anesthesia Stop: 1525    Procedure: LAPAROSCOPIC ROBOTIC ASSISTED CHOLECYSTECTOMY (Abdomen) Diagnosis:       Symptomatic cholelithiasis      (Symptomatic cholelithiasis [K80.20])    Surgeons: Temo Foy MD Responsible Provider: Ilan Lozano MD    Anesthesia Type: General ASA Status: 3            Anesthesia Type: General    Emmie Phase I: Emmie Score: 4    Emmie Phase II:      Anesthesia Post Evaluation    Patient location during evaluation: PACU  Patient participation: complete - patient participated  Level of consciousness: awake  Airway patency: patent  Nausea & Vomiting: no nausea and no vomiting  Cardiovascular status: hemodynamically stable  Respiratory status: acceptable  Hydration status: stable  Pain management: adequate    No notable events documented.

## 2025-03-21 NOTE — ANESTHESIA PRE PROCEDURE
Department of Anesthesiology  Preprocedure Note       Name:  Cintia Rosa   Age:  54 y.o.  :  1970                                          MRN:  [id number]         Date:  3/21/2025      Surgeon: Surgeon(s):  Temo Foy MD    Procedure: Procedure(s):  LAPAROSCOPIC ROBOTIC ASSISTED CHOLECYSTECTOMY, POSSIBLE OPEN    Medications prior to admission:   Prior to Admission medications    Medication Sig Start Date End Date Taking? Authorizing Provider   ibuprofen (ADVIL;MOTRIN) 200 MG tablet Take 2 tablets by mouth every 6 hours as needed for Pain   Yes Provider, MD Lucas   budesonide-formoterol (SYMBICORT) 160-4.5 MCG/ACT AERO Inhale 2 puffs into the lungs daily 3/20/25 9/16/25  Christy Howell MD   cefdinir (OMNICEF) 300 MG capsule Take 1 capsule by mouth 2 times daily for 7 days 3/19/25 3/26/25  Priya Yuen DO   metroNIDAZOLE (FLAGYL) 500 MG tablet Take 1 tablet by mouth 2 times daily for 7 days 3/19/25 3/26/25  Priya Yuen DO   albuterol sulfate HFA (VENTOLIN HFA) 108 (90 Base) MCG/ACT inhaler Inhale 2 puffs into the lungs 4 times daily as needed for Wheezing 24   Christy Howell MD       Current medications:    Current Facility-Administered Medications   Medication Dose Route Frequency Provider Last Rate Last Admin   • lactated ringers infusion   IntraVENous Continuous Temo Ding DO       • sodium chloride flush 0.9 % injection 5-40 mL  5-40 mL IntraVENous 2 times per day Jade Kay APRN - CNP       • sodium chloride flush 0.9 % injection 5-40 mL  5-40 mL IntraVENous PRN Jade Kay APRN - CNP       • 0.9 % sodium chloride infusion   IntraVENous PRN Jade Kay APRN - CNP       • ceFAZolin (ANCEF) 2,000 mg in sterile water 20 mL IV syringe  2,000 mg IntraVENous On Call to OR Jade Kay APRN - CNP       • indocyanine green (IC-GREEN) syringe 5 mg  5 mg IntraVENous Once Jade Kay APRN - CNP           Allergies:  No Known Allergies    Problem List:

## 2025-03-21 NOTE — PROGRESS NOTES
DR Foy here. Informed of rash on back, circling around to front left abd..   Milagro Hough RN  3/21/2025  1112  Dr Ansari here. Examined pt's rash.He will speak with Dr Foy.  Milagro Hough RN  3/21/2025  11:30 AM  Surgery needs to proceed per Dr Foy. Priya , Charge nurse OR notified pf rash, possible shingles. Rash is not weeping.  Milagro Hough RN  3/21/2025  5701

## 2025-03-21 NOTE — DISCHARGE INSTRUCTIONS
PATIENT INSTRUCTIONS  GALL BLADDER SURGERY  (CHOLECYSTECTOMY)    You may be drowsy or lightheaded after receiving sedation or anesthesia.    A responsible person should be with you for the next 24 hours.    Please follow the instructions checked below:    DIET INSTRUCTIONS:  [x]Start with light diet and progress to your normal diet as you feel like eating. If you experience nausea or repeated episodes of vomiting which persist beyond 12-24 hours, notify your doctor.  [x]Other - It is a good idea to eat a high fiber diet and take in plenty of fluids to prevent constipation.  If you do become constipated you may want to take a mild laxative or take ducolax tablets on a daily basis until your bowel habits are regular.  Constipation can be very uncomfortable, along with straining, after recent abdominal surgery.    ACTIVITY INSTRUCTIONS:  [x]Rest today. Increase activity as tolerated    [x]No heavy lifting or strenuous activity until you are seen in the office for follow up    [x]No driving for 1 day or while on narcotics  [x]Other -  You are encouraged to cough and deep breath or use your incentive spirometer if you were given one, every 15-30 minutes when awake.  This will help prevent respiratory complications and low grade fevers post-operatively.  You may want to hug a pillow when coughing and sneezing to add additional support to the surgical area(s) which will decrease pain during these times.  You are encouraged to walk and engage in light activity for the next two weeks.  You should not lift more than 20 pounds during this time frame as it could put you at increased risk for a post-operative hernia.  Twenty pounds is roughly equivalent to a plastic bag of groceries.       WOUND/DRESSING INSTRUCTIONS:  Always ensure you and your care giver clean hands before and after caring for the wound.  [x]May shower      []May bathe      [x]Derma bond dressing-Do not apply lotion, gel, or liquid to wound while the derma  bond is in place.   [x]Other - Keep a dry clean dressing on the wound if there is drainage. Dermabond dressing will fall off in 1-2 weeks.  If clothing rubs against the wound or causes irritation and the wound is not draining you may cover it with a dry dressing during the daytime.  Try to keep the wound dry and avoid ointments on the wound unless directed to do so.  If the wound becomes bright red and painful or starts to drain infected material that is not clear, please contact your physician immediately.   You should also call if you begin to drain fluid that is thin and greenish-brown from the wound and appears to look like bile.  If the wound though is mildly pink and has a thick firm ridge underneath it, this is normal, and is referred to as a healing ridge.  This will resolve over the next 4-6 weeks.          MEDICATION INSTRUCTIONS:    [x]Prescriptions sent with you.  Use as directed.  When taking pain medications, you may experience dizziness or drowsiness.  Do not drink alcohol or drive when taking these medications.  [x]You may take a non-prescription “headache remedy”, preferably one that does not contain aspirin.    Other Instructions:      FOLLOW-UP CARE:  [x]Call the office for follow-up appointment in 2 weeks     Call your physician immediately if you have any fevers greater than 102.5, drainage from your wound that is not clear or looks infected, persistent bleeding, increasing abdominal pain, problems urinating, or persistent nausea/vomiting.  You should be aware that you may have right shoulder pain after surgery and that this will progressively go away.  This is called 'referred pain' and is from the area of the gallbladder.  It can also be caused by gas that may be trapped under the diaphragm from the surgery, especially if it was performed laparoscopically through mini-incisions.  This gas will progressively get reabsorbed by your body.

## 2025-03-21 NOTE — OP NOTE
Operative Note      Patient: Cintia Rosa  YOB: 1970  MRN: [id number]    Date of Procedure: 3/21/2025    Pre-Op Diagnosis Codes:      * Symptomatic cholelithiasis [K80.20]    Post-Op Diagnosis:  Acute cholecystitis with hydropic gallbladder and cystic duct stone.       Procedure(s):  LAPAROSCOPIC ROBOTIC ASSISTED CHOLECYSTECTOMY    Surgeon(s):  Temo Foy MD    Assistant:   First Assistant: Antelmo Rogers RN    Anesthesia: General    Estimated Blood Loss (mL): Minimal    Complications: None    Specimens:   ID Type Source Tests Collected by Time Destination   A : Gallbladder and contents Tissue Tissue SURGICAL PATHOLOGY Temo Foy MD 3/21/2025 1412        Implants:  Implant Name Type Inv. Item Serial No.  Lot No. LRB No. Used Action   CLIP INT M L POLYMER FARHAN LIG HEM O FARHAN - [id number]  CLIP INT M L POLYMER FARHAN LIG HEM O FARHAN  [employer] [id number] N/A 4 Implanted         Drains: * No LDAs found *    Findings:  Infection Present At Time Of Surgery (PATOS) (choose all levels that have infection present):  - Superficial Infection (skin/subcutaneous) present as evidenced by fluid consistent with infection  - Deep Infection (muscle/fascia) present as evidenced by fluid consistent with infection  - Organ Space infection (below fascia) present as evidenced by feculent peritonitis  Other Findings: Distended hydropic gallbladder with cystic duct obstruction    Detailed Description of Procedure:     Patient is a 54 y.o. female who was diagnosed with the above. Risks, benefits, and alternatives of the procedure, including bleeding, infection, bile leak, CBD injury, possibility for open procedure/subtotal cholecystectomy, possibility for future procedures were discussed with the patient. The planned procedure was agreed to and informed consent was obtained.          After informed consent was obtained patient brought to operating table placed in supine position.  General  bile duct.  Care was taken not to injure the common bile duct.  Firefly was used extensively during this dissection.  The cystic duct and cystic artery were circumferentially dissected along their course.  During dissection of the cystic artery there was some bleeding at the distal portion prior to completion or the critical view of safety so the cystic duct was clipped and divided.  Due to the extremely short cystic duct and the decision made not to perform a cholangiogram as I was concerned I would not be able to place multiple clips proximal to the choledochotomy.  A moment was taken to confirm anatomy next the cystic duct was clipped x2 proximally and x1 distally. This was divided with scissors.   Electrocautery and blunt dissection were used to dissect the gallbladder off the liver bed.  Hemostasis ensured with electrocautery.  Clips were again visualized and remained intact without any evidence of bile or bleeding. Over 1 hour was spent on extensive dissection. Gallbladder was placed in a bag and removed via the LUQ port site and sent for specimen.  The left upper quadrant port site fascia was closed using 0 Vicryl on a Ferny Nesbitt and the port sites were closed with 4-0 suture in subcuticular fashion.  Derma bond was applied.  Patient tolerated procedure well and was transferred PACU in stable condition. All counts were correct.     Electronically signed by Temo Foy MD on 3/21/2025 at 4:33 PM

## 2025-03-21 NOTE — INTERVAL H&P NOTE
Update History & Physical    The patient's History and Physical of March 19, 2025 was reviewed with the patient and I examined the patient. There was no change. The surgical site was confirmed by the patient and me.     Plan: The risks, benefits, expected outcome, and alternative to the recommended procedure have been discussed with the patient. Patient understands and wants to proceed with the procedure.     Electronically signed by Temo Foy MD on 3/21/2025 at 11:00 AM

## 2025-03-24 ENCOUNTER — TELEPHONE (OUTPATIENT)
Dept: PRIMARY CARE CLINIC | Age: 55
End: 2025-03-24

## 2025-03-24 NOTE — TELEPHONE ENCOUNTER
Patient believes she needs additional antibiotics to help with her shingles. Patient stated  the surgeon told her after her surgery she needed to contact her PCP and request additional antibiotic    Pharmacy [facility]

## 2025-04-02 NOTE — TELEPHONE ENCOUNTER
Patient contacted at home to inquire of Shingles symptoms and states these are subsiding and she is feeling better at this time.

## 2025-04-03 ENCOUNTER — OFFICE VISIT (OUTPATIENT)
Dept: SURGERY | Age: 55
End: 2025-04-03

## 2025-04-03 VITALS
SYSTOLIC BLOOD PRESSURE: 137 MMHG | TEMPERATURE: 97.5 F | RESPIRATION RATE: 18 BRPM | HEART RATE: 79 BPM | BODY MASS INDEX: 35.51 KG/M2 | HEIGHT: 62 IN | DIASTOLIC BLOOD PRESSURE: 79 MMHG | WEIGHT: 193 LBS

## 2025-04-03 DIAGNOSIS — Z09 POSTOP CHECK: Primary | ICD-10-CM

## 2025-04-03 PROCEDURE — 99024 POSTOP FOLLOW-UP VISIT: CPT | Performed by: SURGERY

## 2025-04-03 NOTE — PROGRESS NOTES
[facility]  Temo Foy MD  [address]  [phone] (Office)  [phone] (Fax)      Patient's Name/Date of Birth: Cintia Rosa / 1970    Date: April 3, 2025     Surgeon: Temo Foy MD    Chief Complaint: s/p lap kulwinder    Patient Active Problem List   Diagnosis    Rheumatoid arthritis involving multiple sites with positive rheumatoid factor (HCC)    Simple chronic bronchitis (HCC)    Primary hypertension    Chronic fatigue    Hyperlipidemia, mixed    Vitamin D deficiency    Mixed simple and mucopurulent chronic bronchitis (HCC)    COPD with acute exacerbation (HCC)    Hypoxia    Symptomatic cholelithiasis       Subjective: Doing well, no pain, no longer taking narcotics, having bowel movements    Objective:  /79 (BP Site: Left Upper Arm, Patient Position: Sitting, BP Cuff Size: Large Adult)   Pulse 79   Temp 97.5 °F (36.4 °C) (Temporal)   Resp 18   Ht 1.575 m (5' 2\")   Wt 87.5 kg (193 lb)   LMP 03/15/2025 (Approximate)   BMI 35.30 kg/m²   Labs:  No results for input(s): \"WBC\", \"HGB\", \"HCT\" in the last 72 hours.    Invalid input(s): \"PLR\"  Lab Results   Component Value Date    CREATININE 0.6 03/19/2025    BUN 10 03/19/2025     03/19/2025    K 4.1 03/19/2025     03/19/2025    CO2 29 03/19/2025       General appearance: AA, NAD  HEENT: NCAT, PERRLA, EOMI  Lungs: Clear, equal rise bilateral  Heart: Reg  Abdomen: soft, nondistended, nontender, incisions well healed      Pathology: Not back/reported yet    Assessment/Plan:  Cintia Rosa is a 54 y.o. female who is 2 weeks post laparoscopic robotic assisted cholecystectomy for hydropic gallbladder    Doing well  Resume diet as tolerated  Resume activity as tolerated, no heavy lifting greater than 10-15lbs for 2 more weeks  Pathology not resulted yet discussed with patient she can call the office in 1 week if she would like the results she states she does have MyChart and she can review her results

## 2025-04-04 LAB — SURGICAL PATHOLOGY REPORT: NORMAL

## 2025-04-07 SDOH — ECONOMIC STABILITY: FOOD INSECURITY: WITHIN THE PAST 12 MONTHS, THE FOOD YOU BOUGHT JUST DIDN'T LAST AND YOU DIDN'T HAVE MONEY TO GET MORE.: NEVER TRUE

## 2025-04-07 SDOH — ECONOMIC STABILITY: FOOD INSECURITY: WITHIN THE PAST 12 MONTHS, YOU WORRIED THAT YOUR FOOD WOULD RUN OUT BEFORE YOU GOT MONEY TO BUY MORE.: NEVER TRUE

## 2025-04-07 SDOH — ECONOMIC STABILITY: INCOME INSECURITY: IN THE LAST 12 MONTHS, WAS THERE A TIME WHEN YOU WERE NOT ABLE TO PAY THE MORTGAGE OR RENT ON TIME?: NO

## 2025-04-07 SDOH — ECONOMIC STABILITY: TRANSPORTATION INSECURITY
IN THE PAST 12 MONTHS, HAS LACK OF TRANSPORTATION KEPT YOU FROM MEETINGS, WORK, OR FROM GETTING THINGS NEEDED FOR DAILY LIVING?: NO

## 2025-04-07 SDOH — ECONOMIC STABILITY: TRANSPORTATION INSECURITY
IN THE PAST 12 MONTHS, HAS THE LACK OF TRANSPORTATION KEPT YOU FROM MEDICAL APPOINTMENTS OR FROM GETTING MEDICATIONS?: NO

## 2025-04-07 ASSESSMENT — PATIENT HEALTH QUESTIONNAIRE - PHQ9
SUM OF ALL RESPONSES TO PHQ QUESTIONS 1-9: 0
2. FEELING DOWN, DEPRESSED OR HOPELESS: NOT AT ALL
2. FEELING DOWN, DEPRESSED OR HOPELESS: NOT AT ALL
SUM OF ALL RESPONSES TO PHQ9 QUESTIONS 1 & 2: 0
1. LITTLE INTEREST OR PLEASURE IN DOING THINGS: NOT AT ALL
1. LITTLE INTEREST OR PLEASURE IN DOING THINGS: NOT AT ALL

## 2025-04-10 ENCOUNTER — OFFICE VISIT (OUTPATIENT)
Dept: PRIMARY CARE CLINIC | Age: 55
End: 2025-04-10

## 2025-04-10 VITALS
HEIGHT: 62 IN | DIASTOLIC BLOOD PRESSURE: 74 MMHG | SYSTOLIC BLOOD PRESSURE: 132 MMHG | WEIGHT: 194.7 LBS | BODY MASS INDEX: 35.83 KG/M2 | OXYGEN SATURATION: 92 % | HEART RATE: 84 BPM | TEMPERATURE: 98 F

## 2025-04-10 DIAGNOSIS — K80.20 SYMPTOMATIC CHOLELITHIASIS: ICD-10-CM

## 2025-04-10 DIAGNOSIS — D75.1 ERYTHROCYTOSIS: ICD-10-CM

## 2025-04-10 DIAGNOSIS — J41.0 SIMPLE CHRONIC BRONCHITIS (HCC): ICD-10-CM

## 2025-04-10 DIAGNOSIS — Z12.11 COLON CANCER SCREENING: ICD-10-CM

## 2025-04-10 DIAGNOSIS — Z90.49 STATUS POST CHOLECYSTECTOMY: ICD-10-CM

## 2025-04-10 DIAGNOSIS — Z12.31 BREAST CANCER SCREENING BY MAMMOGRAM: ICD-10-CM

## 2025-04-10 DIAGNOSIS — Z87.891 PERSONAL HISTORY OF TOBACCO USE: ICD-10-CM

## 2025-04-10 DIAGNOSIS — M05.79 RHEUMATOID ARTHRITIS INVOLVING MULTIPLE SITES WITH POSITIVE RHEUMATOID FACTOR (HCC): Primary | ICD-10-CM

## 2025-04-10 DIAGNOSIS — E78.2 HYPERLIPIDEMIA, MIXED: ICD-10-CM

## 2025-04-10 RX ORDER — BUDESONIDE AND FORMOTEROL FUMARATE DIHYDRATE 160; 4.5 UG/1; UG/1
2 AEROSOL RESPIRATORY (INHALATION) 2 TIMES DAILY
Qty: 1 EACH | Refills: 2 | Status: SHIPPED | OUTPATIENT
Start: 2025-04-10 | End: 2025-10-07

## 2025-04-10 NOTE — PROGRESS NOTES
ability to have lung surgery.    The patient  reports that she has been smoking cigarettes. She started smoking about 41 years ago. She has a 20.6 pack-year smoking history. She has never used smokeless tobacco.. Discussed with patient the risks and benefits of screening, including over-diagnosis, false positive rate, and total radiation exposure.  The patient currently exhibits no signs or symptoms suggestive of lung cancer.  Discussed with patient the importance of compliance with yearly annual lung cancer screenings and willingness to undergo diagnosis and treatment if screening scan is positive.  In addition, the patient was counseled regarding the importance of remaining smoke free and/or total smoking cessation.    Also reviewed the following if the patient has Medicare that as of February 10, 2022, Medicare only covers LDCT screening in patients aged 50-77 with at least a 20 pack-year smoking history who currently smoke or have quit in the last 15 years

## 2025-05-09 ENCOUNTER — OFFICE VISIT (OUTPATIENT)
Dept: ONCOLOGY | Age: 55
End: 2025-05-09
Payer: COMMERCIAL

## 2025-05-09 ENCOUNTER — HOSPITAL ENCOUNTER (OUTPATIENT)
Dept: INFUSION THERAPY | Age: 55
Discharge: HOME | End: 2025-05-09
Payer: COMMERCIAL

## 2025-05-09 VITALS
HEIGHT: 62 IN | SYSTOLIC BLOOD PRESSURE: 137 MMHG | HEART RATE: 81 BPM | OXYGEN SATURATION: 93 % | BODY MASS INDEX: 37.58 KG/M2 | WEIGHT: 204.2 LBS | DIASTOLIC BLOOD PRESSURE: 71 MMHG | TEMPERATURE: 97.7 F

## 2025-05-09 DIAGNOSIS — D75.1 POLYCYTHEMIA: Primary | ICD-10-CM

## 2025-05-09 DIAGNOSIS — D75.1 POLYCYTHEMIA: ICD-10-CM

## 2025-05-09 LAB
ALBUMIN SERPL-MCNC: 4 G/DL (ref 3.5–5.2)
ALP SERPL-CCNC: 89 U/L (ref 35–104)
ALT SERPL-CCNC: 30 U/L (ref 0–32)
ANION GAP SERPL CALCULATED.3IONS-SCNC: 10 MMOL/L (ref 7–16)
AST SERPL-CCNC: 21 U/L (ref 0–31)
BASOPHILS # BLD: 0 K/UL (ref 0–0.2)
BASOPHILS NFR BLD: 0 % (ref 0–2)
BILIRUB SERPL-MCNC: 0.3 MG/DL (ref 0–1.2)
BUN SERPL-MCNC: 9 MG/DL (ref 6–20)
CALCIUM SERPL-MCNC: 9.5 MG/DL (ref 8.6–10.2)
CHLORIDE SERPL-SCNC: 102 MMOL/L (ref 98–107)
CO2 SERPL-SCNC: 29 MMOL/L (ref 22–29)
CREAT SERPL-MCNC: 0.6 MG/DL (ref 0.5–1)
EOSINOPHIL # BLD: 0.56 K/UL (ref 0.05–0.5)
EOSINOPHILS RELATIVE PERCENT: 6 % (ref 0–6)
ERYTHROCYTE [DISTWIDTH] IN BLOOD BY AUTOMATED COUNT: 14.4 % (ref 11.5–15)
FERRITIN SERPL-MCNC: 540 NG/ML
GFR, ESTIMATED: >90 ML/MIN/1.73M2
GLUCOSE SERPL-MCNC: 110 MG/DL (ref 74–99)
HCT VFR BLD AUTO: 55.3 % (ref 34–48)
HGB BLD-MCNC: 17.4 G/DL (ref 11.5–15.5)
IRON SATN MFR SERPL: 40 % (ref 15–50)
IRON SERPL-MCNC: 107 UG/DL (ref 37–145)
LDH SERPL-CCNC: 199 U/L (ref 135–214)
LYMPHOCYTES NFR BLD: 2.73 K/UL (ref 1.5–4)
LYMPHOCYTES RELATIVE PERCENT: 29 % (ref 20–42)
MCH RBC QN AUTO: 28 PG (ref 26–35)
MCHC RBC AUTO-ENTMCNC: 31.5 G/DL (ref 32–34.5)
MCV RBC AUTO: 88.9 FL (ref 80–99.9)
MONOCYTES NFR BLD: 0.66 K/UL (ref 0.1–0.95)
MONOCYTES NFR BLD: 7 % (ref 2–12)
NEUTROPHILS NFR BLD: 58 % (ref 43–80)
NEUTS SEG NFR BLD: 5.45 K/UL (ref 1.8–7.3)
PLATELET # BLD AUTO: 243 K/UL (ref 130–450)
PMV BLD AUTO: 11.7 FL (ref 7–12)
POTASSIUM SERPL-SCNC: 4.5 MMOL/L (ref 3.5–5)
PROT SERPL-MCNC: 7.4 G/DL (ref 6.4–8.3)
RBC # BLD AUTO: 6.22 M/UL (ref 3.5–5.5)
RBC # BLD: NORMAL 10*6/UL
SEND OUT REPORT: NORMAL
SEND OUT REPORT: NORMAL
SODIUM SERPL-SCNC: 141 MMOL/L (ref 132–146)
TEST NAME: NORMAL
TEST NAME: NORMAL
TIBC SERPL-MCNC: 266 UG/DL (ref 250–450)
WBC OTHER # BLD: 9.4 K/UL (ref 4.5–11.5)

## 2025-05-09 PROCEDURE — 83550 IRON BINDING TEST: CPT

## 2025-05-09 PROCEDURE — 4004F PT TOBACCO SCREEN RCVD TLK: CPT | Performed by: STUDENT IN AN ORGANIZED HEALTH CARE EDUCATION/TRAINING PROGRAM

## 2025-05-09 PROCEDURE — G8417 CALC BMI ABV UP PARAM F/U: HCPCS | Performed by: STUDENT IN AN ORGANIZED HEALTH CARE EDUCATION/TRAINING PROGRAM

## 2025-05-09 PROCEDURE — 82728 ASSAY OF FERRITIN: CPT

## 2025-05-09 PROCEDURE — 82668 ASSAY OF ERYTHROPOIETIN: CPT

## 2025-05-09 PROCEDURE — 36415 COLL VENOUS BLD VENIPUNCTURE: CPT

## 2025-05-09 PROCEDURE — 3078F DIAST BP <80 MM HG: CPT | Performed by: STUDENT IN AN ORGANIZED HEALTH CARE EDUCATION/TRAINING PROGRAM

## 2025-05-09 PROCEDURE — G8427 DOCREV CUR MEDS BY ELIG CLIN: HCPCS | Performed by: STUDENT IN AN ORGANIZED HEALTH CARE EDUCATION/TRAINING PROGRAM

## 2025-05-09 PROCEDURE — 99204 OFFICE O/P NEW MOD 45 MIN: CPT | Performed by: STUDENT IN AN ORGANIZED HEALTH CARE EDUCATION/TRAINING PROGRAM

## 2025-05-09 PROCEDURE — 83540 ASSAY OF IRON: CPT

## 2025-05-09 PROCEDURE — 3075F SYST BP GE 130 - 139MM HG: CPT | Performed by: STUDENT IN AN ORGANIZED HEALTH CARE EDUCATION/TRAINING PROGRAM

## 2025-05-09 PROCEDURE — 3017F COLORECTAL CA SCREEN DOC REV: CPT | Performed by: STUDENT IN AN ORGANIZED HEALTH CARE EDUCATION/TRAINING PROGRAM

## 2025-05-09 PROCEDURE — 85025 COMPLETE CBC W/AUTO DIFF WBC: CPT

## 2025-05-09 PROCEDURE — 83615 LACTATE (LD) (LDH) ENZYME: CPT

## 2025-05-09 PROCEDURE — 80053 COMPREHEN METABOLIC PANEL: CPT

## 2025-05-09 NOTE — PROGRESS NOTES
[facility]  [address] [place] [id number]  Dept: [phone]  Loc: [phone]    Delma Abraham MD   ·   MD Cynthia Blancas APRN  ·  ERICK Paz      [place] [facility] in [address] [address]  P: [phone]  F: [phone] [place] [facility] [place]  [address] [place] [address]. [place] [address]  P: [phone]  F: [phone]  [place] [facility] in [place]  [address] [place] [address]  P: [phone]  F: [phone]                 Hematology/Oncology   Clinic Consultation Note              Patient: Cintia Rosa,  54 y.o. female    Date of Encounter: 05/09/2025     Referring Provider:  Christy Howell MD    Reason for Visit:   Polycythemia        PCP:  Christy Howell MD      Chief Complaint   Patient presents with    New Patient         History of Present Illness of Initial Consultation (5/9/2025):  The patient is a 54 y.o. female comes in for evaluation for polycythemia.  Per chart review it appears that patient's hemoglobin/hematocrit has been elevated at least since June 2022.  Hemoglobin hematocrit would last normal back in 2012.  Patient was accompanied by her  today.    Patient is a smoker, smokes at least 1/2 pack/day.  Also snores at night according to her .  Also has background diagnosis of COPD.    Most recently, CBC from 3/19/2025 showed hemoglobin hematocrit of 19.3 and 61.1%, respectively.  The patient denies any fevers, chills, unintended weight loss, night sweats or adenopathy.  She feels \"fine\", at her baseline state of health.    Also on 3/19/2025 with her last CBC, she recalls having undergone elective cholecystectomy which was completed on 3/21/2025.  Patient reports having felt dehydrated, recovered from bronchitis.  And she states that she is \"bad at drinking water.\"        Past Medical History:   Diagnosis Date    Arthritis,

## 2025-05-11 LAB — EPO SERPL-ACNC: 6 MU/ML (ref 4–27)

## 2025-05-12 LAB — PATH REV BLD -IMP: NORMAL

## 2025-05-14 LAB — NONINV COLON CA DNA+OCC BLD SCRN STL QL: NEGATIVE

## 2025-05-19 LAB
SEND OUT REPORT: NORMAL
TEST NAME: NORMAL

## 2025-05-20 LAB
SEND OUT REPORT: NORMAL
TEST NAME: NORMAL

## 2025-05-23 ENCOUNTER — OFFICE VISIT (OUTPATIENT)
Age: 55
End: 2025-05-23
Payer: COMMERCIAL

## 2025-05-23 VITALS
BODY MASS INDEX: 37.91 KG/M2 | HEART RATE: 76 BPM | TEMPERATURE: 97.2 F | OXYGEN SATURATION: 92 % | DIASTOLIC BLOOD PRESSURE: 102 MMHG | SYSTOLIC BLOOD PRESSURE: 124 MMHG | WEIGHT: 206 LBS | HEIGHT: 62 IN

## 2025-05-23 DIAGNOSIS — D75.1 POLYCYTHEMIA: Primary | ICD-10-CM

## 2025-05-23 PROCEDURE — 3017F COLORECTAL CA SCREEN DOC REV: CPT | Performed by: STUDENT IN AN ORGANIZED HEALTH CARE EDUCATION/TRAINING PROGRAM

## 2025-05-23 PROCEDURE — 3074F SYST BP LT 130 MM HG: CPT | Performed by: STUDENT IN AN ORGANIZED HEALTH CARE EDUCATION/TRAINING PROGRAM

## 2025-05-23 PROCEDURE — G8417 CALC BMI ABV UP PARAM F/U: HCPCS | Performed by: STUDENT IN AN ORGANIZED HEALTH CARE EDUCATION/TRAINING PROGRAM

## 2025-05-23 PROCEDURE — 99213 OFFICE O/P EST LOW 20 MIN: CPT | Performed by: STUDENT IN AN ORGANIZED HEALTH CARE EDUCATION/TRAINING PROGRAM

## 2025-05-23 PROCEDURE — G8427 DOCREV CUR MEDS BY ELIG CLIN: HCPCS | Performed by: STUDENT IN AN ORGANIZED HEALTH CARE EDUCATION/TRAINING PROGRAM

## 2025-05-23 PROCEDURE — 3080F DIAST BP >= 90 MM HG: CPT | Performed by: STUDENT IN AN ORGANIZED HEALTH CARE EDUCATION/TRAINING PROGRAM

## 2025-05-23 PROCEDURE — 4004F PT TOBACCO SCREEN RCVD TLK: CPT | Performed by: STUDENT IN AN ORGANIZED HEALTH CARE EDUCATION/TRAINING PROGRAM

## 2025-05-23 NOTE — PROGRESS NOTES
quit smoking, which can reduce the risk of lung cancer, cardiovascular disease, and other types of cancers, as well as lower the red blood cell count. Encouraged to consider evaluation for potential sleep apnea. Discussed the possibility of donating blood to the [facility] and advised to verify eligibility due to a history of tuberculosis. If the [facility] does not accept the donation, therapeutic phlebotomy could be considered, although it may incur costs.     Follow-up  Follow up as needed.  Advised patient to reach out to us if any additional questions or concerns arise.        No orders of the defined types were placed in this encounter.           DISPO:   Return if symptoms worsen or fail to improve.      Thank you for allowing us to participate in the care of Cintia E Moe       Approximately spent 27 minutes on chart review as well as time spent on patient encounter, discussing the laboratory, imaging, and clinical findings; and documentation. I have discussed clinical implications and recommendations on the patient's primary issues. More than 50% of time was spent counseling patient. The patient verbalized understanding.      Tanner Garcia MD  Medical Oncology  [facility]  05/23/2025

## 2025-07-09 DIAGNOSIS — R09.02 HYPOXIA: ICD-10-CM

## 2025-07-09 DIAGNOSIS — J44.1 COPD WITH ACUTE EXACERBATION (HCC): ICD-10-CM

## 2025-07-10 RX ORDER — ALBUTEROL SULFATE 90 UG/1
INHALANT RESPIRATORY (INHALATION)
Qty: 54 G | Refills: 0 | Status: SHIPPED | OUTPATIENT
Start: 2025-07-10

## 2025-07-10 RX ORDER — BUDESONIDE AND FORMOTEROL FUMARATE DIHYDRATE 160; 4.5 UG/1; UG/1
2 AEROSOL RESPIRATORY (INHALATION) 2 TIMES DAILY
Qty: 1 EACH | Refills: 2 | Status: SHIPPED | OUTPATIENT
Start: 2025-07-10 | End: 2026-01-06

## 2025-07-11 ENCOUNTER — OFFICE VISIT (OUTPATIENT)
Dept: PRIMARY CARE CLINIC | Age: 55
End: 2025-07-11
Payer: COMMERCIAL

## 2025-07-11 VITALS
OXYGEN SATURATION: 93 % | HEART RATE: 74 BPM | BODY MASS INDEX: 38.16 KG/M2 | WEIGHT: 207.4 LBS | HEIGHT: 62 IN | TEMPERATURE: 97.3 F | SYSTOLIC BLOOD PRESSURE: 126 MMHG | DIASTOLIC BLOOD PRESSURE: 74 MMHG

## 2025-07-11 DIAGNOSIS — M05.79 RHEUMATOID ARTHRITIS INVOLVING MULTIPLE SITES WITH POSITIVE RHEUMATOID FACTOR (HCC): Primary | ICD-10-CM

## 2025-07-11 DIAGNOSIS — E78.2 HYPERLIPIDEMIA, MIXED: ICD-10-CM

## 2025-07-11 DIAGNOSIS — D75.1 ERYTHROCYTOSIS: ICD-10-CM

## 2025-07-11 DIAGNOSIS — J41.0 SIMPLE CHRONIC BRONCHITIS (HCC): ICD-10-CM

## 2025-07-11 PROCEDURE — G8427 DOCREV CUR MEDS BY ELIG CLIN: HCPCS | Performed by: INTERNAL MEDICINE

## 2025-07-11 PROCEDURE — 3017F COLORECTAL CA SCREEN DOC REV: CPT | Performed by: INTERNAL MEDICINE

## 2025-07-11 PROCEDURE — 4004F PT TOBACCO SCREEN RCVD TLK: CPT | Performed by: INTERNAL MEDICINE

## 2025-07-11 PROCEDURE — 99213 OFFICE O/P EST LOW 20 MIN: CPT | Performed by: INTERNAL MEDICINE

## 2025-07-11 PROCEDURE — G8417 CALC BMI ABV UP PARAM F/U: HCPCS | Performed by: INTERNAL MEDICINE

## 2025-07-11 PROCEDURE — 3023F SPIROM DOC REV: CPT | Performed by: INTERNAL MEDICINE

## 2025-07-11 PROCEDURE — 3074F SYST BP LT 130 MM HG: CPT | Performed by: INTERNAL MEDICINE

## 2025-07-11 PROCEDURE — 3078F DIAST BP <80 MM HG: CPT | Performed by: INTERNAL MEDICINE

## 2025-07-11 NOTE — PROGRESS NOTES
Phosphatase   Date Value Ref Range Status   05/09/2025 89 35 - 104 U/L Final     AST   Date Value Ref Range Status   05/09/2025 21 0 - 31 U/L Final     ALT   Date Value Ref Range Status   05/09/2025 30 0 - 32 U/L Final     Est, Glom Filt Rate   Date Value Ref Range Status   05/09/2025 >90 >60 mL/min/1.73m2 Final     Comment:           These results are not intended for use in patients <18 years of age.        eGFR results are calculated without a race factor using the 2021 CKD-EPI equation.  Careful clinical correlation is recommended, particularly when comparing to results   calculated using previous equations.  The CKD-EPI equation is less accurate in patients with extremes of muscle mass, extra-renal   metabolism of creatine, excessive creatine ingestion, or following therapy that affects   renal tubular secretion.       GFR    Date Value Ref Range Status   06/20/2022 >60  Final        No results found.     No colonoscopy on file   Date of last Cologuard: 5/6/2025  No FIT/FOBT on file   No flexible sigmoidoscopy on file   Date of last Mammogram: 7/15/2022  No cervical cancer screening on file      Assessment/Plan:      Outpatient Encounter Medications as of 7/11/2025   Medication Sig Dispense Refill    albuterol sulfate HFA (PROVENTIL;VENTOLIN;PROAIR) 108 (90 Base) MCG/ACT inhaler INHALE TWO PUFFS BY MOUTH INTO THE LUNGS FOUR TIMES A DAY AS NEEDED FOR WHEEZING 54 g 0    budesonide-formoterol (SYMBICORT) 160-4.5 MCG/ACT AERO Inhale 2 puffs into the lungs 2 times daily 1 each 2    ibuprofen (ADVIL;MOTRIN) 200 MG tablet Take 2 tablets by mouth every 6 hours as needed for Pain       No facility-administered encounter medications on file as of 7/11/2025.        Cintia was seen today for 3 month follow-up.    Diagnoses and all orders for this visit:    Rheumatoid arthritis involving multiple sites with positive rheumatoid factor (HCC)  Stable off medication    Hyperlipidemia, mixed  -     Lipid Panel;

## (undated) DEVICE — SOLUTION IRRIG 1000ML H2O PIC PLAS SHATTERPROOF CONTAINER

## (undated) DEVICE — 4-PORT MANIFOLD: Brand: NEPTUNE 2

## (undated) DEVICE — NEEDLE HYPO 25GA L1.5IN BLU POLYPR HUB S STL REG BVL STR

## (undated) DEVICE — PUMP SUC IRR TBNG L10FT W/ HNDPC ASSEMB STRYKEFLOW 2

## (undated) DEVICE — APPLICATOR MEDICATED 26 CC SOLUTION HI LT ORNG CHLORAPREP

## (undated) DEVICE — SYRINGE 20ML LL S/C 50

## (undated) DEVICE — MARKER,SKIN,WI/RULER AND LABELS: Brand: MEDLINE

## (undated) DEVICE — GARMENT,MEDLINE,DVT,INT,CALF,MED, GEN2: Brand: MEDLINE

## (undated) DEVICE — PLUMEPORT ACTIV LAPAROSCOPIC SMOKE FILTRATION DEVICE: Brand: PLUMEPORT ACTIVE

## (undated) DEVICE — INSUFFLATION NEEDLE TO ESTABLISH PNEUMOPERITONEUM.: Brand: INSUFFLATION NEEDLE

## (undated) DEVICE — LIQUIBAND RAPID ADHESIVE 36/CS 0.8ML: Brand: MEDLINE

## (undated) DEVICE — ANCHOR TISSUE RETRIEVAL SYSTEM, BAG SIZE 175 ML, PORT SIZE 10 MM: Brand: ANCHOR TISSUE RETRIEVAL SYSTEM

## (undated) DEVICE — SEAL

## (undated) DEVICE — MEDIUM-LARGE CLIP APPLIER: Brand: ENDOWRIST

## (undated) DEVICE — SYRINGE MED 10ML LUERLOCK TIP W/O SFTY DISP

## (undated) DEVICE — SYRINGE MED 10ML TRNSLUC BRL PLUNG BLK MRK POLYPR CTRL

## (undated) DEVICE — BLADE ES ELASTOMERIC COAT INSUL DURABLE BEND UPTO 90DEG

## (undated) DEVICE — COLUMN DRAPE

## (undated) DEVICE — MEDIA CONTRAST ISOVUE 250 100ML

## (undated) DEVICE — ELECTRODE ELECSURG 2 PLATE AD 10 FT 33 LB PT RET MEGADYNE

## (undated) DEVICE — GLOVE ORANGE PI 7   MSG9070

## (undated) DEVICE — SOLUTION IRRIG 1000ML 09% SOD CHL USP PIC PLAS CONTAINER

## (undated) DEVICE — COOK ENDOSCOPIC CHOLANGIOGRAPHY SET: Brand: COOK

## (undated) DEVICE — SCISSORS SURG DIA8MM MPLR CRV ENDOWRIST

## (undated) DEVICE — BLADELESS OBTURATOR: Brand: WECK VISTA

## (undated) DEVICE — WIPES SKIN CLOTH READYPREP 9 X 10.5 IN 2% CHLORHEX GLUCONATE CHG PREOP

## (undated) DEVICE — TIP COVER ACCESSORY

## (undated) DEVICE — BASIC DOUBLE BASIN 2-LF: Brand: MEDLINE INDUSTRIES, INC.

## (undated) DEVICE — PROGRASP FORCEPS: Brand: ENDOWRIST

## (undated) DEVICE — ELECTRO LUBE IS A SINGLE PATIENT USE DEVICE THAT IS INTENDED TO BE USED ON ELECTROSURGICAL ELECTRODES TO REDUCE STICKING.: Brand: KEY SURGICAL ELECTRO LUBE

## (undated) DEVICE — ARM DRAPE

## (undated) DEVICE — SHEET,DRAPE,40X58,STERILE: Brand: MEDLINE

## (undated) DEVICE — COVER,LIGHT HANDLE,FLX,1/PK: Brand: MEDLINE INDUSTRIES, INC.

## (undated) DEVICE — GLOVE SURG SZ 7 L12IN FNGR THK79MIL GRN LTX FREE

## (undated) DEVICE — DRAPE C ARM W41XL65IN UNIV W/ CLP AND RUBBERBAND

## (undated) DEVICE — CLEANER LENS C-CLEAR

## (undated) DEVICE — Device

## (undated) DEVICE — [HIGH FLOW INSUFFLATOR,  DO NOT USE IF PACKAGE IS DAMAGED,  KEEP DRY,  KEEP AWAY FROM SUNLIGHT,  PROTECT FROM HEAT AND RADIOACTIVE SOURCES.]: Brand: PNEUMOSURE